# Patient Record
Sex: FEMALE | Race: WHITE | URBAN - METROPOLITAN AREA
[De-identification: names, ages, dates, MRNs, and addresses within clinical notes are randomized per-mention and may not be internally consistent; named-entity substitution may affect disease eponyms.]

---

## 2018-04-12 ENCOUNTER — EMERGENCY (EMERGENCY)
Facility: HOSPITAL | Age: 28
LOS: 0 days | Discharge: HOME | End: 2018-04-12
Attending: EMERGENCY MEDICINE

## 2018-04-12 VITALS
HEART RATE: 93 BPM | OXYGEN SATURATION: 97 % | DIASTOLIC BLOOD PRESSURE: 83 MMHG | TEMPERATURE: 98 F | RESPIRATION RATE: 18 BRPM | HEIGHT: 68 IN | WEIGHT: 190.04 LBS | SYSTOLIC BLOOD PRESSURE: 108 MMHG

## 2018-04-12 VITALS
DIASTOLIC BLOOD PRESSURE: 79 MMHG | OXYGEN SATURATION: 99 % | TEMPERATURE: 98 F | RESPIRATION RATE: 18 BRPM | HEART RATE: 70 BPM | SYSTOLIC BLOOD PRESSURE: 123 MMHG

## 2018-04-12 DIAGNOSIS — R31.9 HEMATURIA, UNSPECIFIED: ICD-10-CM

## 2018-04-12 DIAGNOSIS — R10.31 RIGHT LOWER QUADRANT PAIN: ICD-10-CM

## 2018-04-12 LAB
ALBUMIN SERPL ELPH-MCNC: 4.4 G/DL — SIGNIFICANT CHANGE UP (ref 3.5–5.2)
ALP SERPL-CCNC: 56 U/L — SIGNIFICANT CHANGE UP (ref 30–115)
ALT FLD-CCNC: 7 U/L — SIGNIFICANT CHANGE UP (ref 0–41)
ANION GAP SERPL CALC-SCNC: 11 MMOL/L — SIGNIFICANT CHANGE UP (ref 7–14)
APPEARANCE UR: CLEAR — SIGNIFICANT CHANGE UP
APTT BLD: 33.2 SEC — SIGNIFICANT CHANGE UP (ref 27–39.2)
AST SERPL-CCNC: 12 U/L — SIGNIFICANT CHANGE UP (ref 0–41)
BASOPHILS # BLD AUTO: 0.09 K/UL — SIGNIFICANT CHANGE UP (ref 0–0.2)
BASOPHILS NFR BLD AUTO: 0.9 % — SIGNIFICANT CHANGE UP (ref 0–1)
BILIRUB SERPL-MCNC: 0.3 MG/DL — SIGNIFICANT CHANGE UP (ref 0.2–1.2)
BILIRUB UR-MCNC: NEGATIVE — SIGNIFICANT CHANGE UP
BUN SERPL-MCNC: 9 MG/DL — LOW (ref 10–20)
CALCIUM SERPL-MCNC: 9.7 MG/DL — SIGNIFICANT CHANGE UP (ref 8.5–10.1)
CHLORIDE SERPL-SCNC: 104 MMOL/L — SIGNIFICANT CHANGE UP (ref 98–110)
CO2 SERPL-SCNC: 28 MMOL/L — SIGNIFICANT CHANGE UP (ref 17–32)
COLOR SPEC: YELLOW — SIGNIFICANT CHANGE UP
CREAT SERPL-MCNC: 0.6 MG/DL — LOW (ref 0.7–1.5)
DIFF PNL FLD: NEGATIVE — SIGNIFICANT CHANGE UP
EOSINOPHIL # BLD AUTO: 0.23 K/UL — SIGNIFICANT CHANGE UP (ref 0–0.7)
EOSINOPHIL NFR BLD AUTO: 2.2 % — SIGNIFICANT CHANGE UP (ref 0–8)
EPI CELLS # UR: (no result) /HPF
GLUCOSE SERPL-MCNC: 91 MG/DL — SIGNIFICANT CHANGE UP (ref 70–99)
GLUCOSE UR QL: NEGATIVE MG/DL — SIGNIFICANT CHANGE UP
HCT VFR BLD CALC: 39.3 % — SIGNIFICANT CHANGE UP (ref 37–47)
HGB BLD-MCNC: 12.9 G/DL — SIGNIFICANT CHANGE UP (ref 12–16)
IMM GRANULOCYTES NFR BLD AUTO: 0.9 % — HIGH (ref 0.1–0.3)
INR BLD: 1.02 RATIO — SIGNIFICANT CHANGE UP (ref 0.65–1.3)
KETONES UR-MCNC: NEGATIVE — SIGNIFICANT CHANGE UP
LACTATE SERPL-SCNC: 1.4 MMOL/L — SIGNIFICANT CHANGE UP (ref 0.5–2.2)
LEUKOCYTE ESTERASE UR-ACNC: (no result)
LYMPHOCYTES # BLD AUTO: 2.74 K/UL — SIGNIFICANT CHANGE UP (ref 1.2–3.4)
LYMPHOCYTES # BLD AUTO: 26.5 % — SIGNIFICANT CHANGE UP (ref 20.5–51.1)
MCHC RBC-ENTMCNC: 31.5 PG — HIGH (ref 27–31)
MCHC RBC-ENTMCNC: 32.8 G/DL — SIGNIFICANT CHANGE UP (ref 32–37)
MCV RBC AUTO: 96.1 FL — SIGNIFICANT CHANGE UP (ref 81–99)
MONOCYTES # BLD AUTO: 0.56 K/UL — SIGNIFICANT CHANGE UP (ref 0.1–0.6)
MONOCYTES NFR BLD AUTO: 5.4 % — SIGNIFICANT CHANGE UP (ref 1.7–9.3)
NEUTROPHILS # BLD AUTO: 6.62 K/UL — HIGH (ref 1.4–6.5)
NEUTROPHILS NFR BLD AUTO: 64.1 % — SIGNIFICANT CHANGE UP (ref 42.2–75.2)
NITRITE UR-MCNC: NEGATIVE — SIGNIFICANT CHANGE UP
NRBC # BLD: 0 /100 WBCS — SIGNIFICANT CHANGE UP (ref 0–0)
PH UR: 7 — SIGNIFICANT CHANGE UP (ref 5–8)
PLATELET # BLD AUTO: 327 K/UL — SIGNIFICANT CHANGE UP (ref 130–400)
POTASSIUM SERPL-MCNC: 4.7 MMOL/L — SIGNIFICANT CHANGE UP (ref 3.5–5)
POTASSIUM SERPL-SCNC: 4.7 MMOL/L — SIGNIFICANT CHANGE UP (ref 3.5–5)
PROT SERPL-MCNC: 6.3 G/DL — SIGNIFICANT CHANGE UP (ref 6–8)
PROT UR-MCNC: NEGATIVE MG/DL — SIGNIFICANT CHANGE UP
PROTHROM AB SERPL-ACNC: 11 SEC — SIGNIFICANT CHANGE UP (ref 9.95–12.87)
RBC # BLD: 4.09 M/UL — LOW (ref 4.2–5.4)
RBC # FLD: 13.2 % — SIGNIFICANT CHANGE UP (ref 11.5–14.5)
SODIUM SERPL-SCNC: 143 MMOL/L — SIGNIFICANT CHANGE UP (ref 135–146)
SP GR SPEC: 1.01 — SIGNIFICANT CHANGE UP (ref 1.01–1.03)
UROBILINOGEN FLD QL: 0.2 MG/DL — SIGNIFICANT CHANGE UP (ref 0.2–0.2)
WBC # BLD: 10.33 K/UL — SIGNIFICANT CHANGE UP (ref 4.8–10.8)
WBC # FLD AUTO: 10.33 K/UL — SIGNIFICANT CHANGE UP (ref 4.8–10.8)
WBC UR QL: SIGNIFICANT CHANGE UP /HPF

## 2018-04-12 RX ORDER — KETOROLAC TROMETHAMINE 30 MG/ML
15 SYRINGE (ML) INJECTION ONCE
Qty: 0 | Refills: 0 | Status: DISCONTINUED | OUTPATIENT
Start: 2018-04-12 | End: 2018-04-12

## 2018-04-12 RX ORDER — SODIUM CHLORIDE 9 MG/ML
1000 INJECTION, SOLUTION INTRAVENOUS ONCE
Qty: 0 | Refills: 0 | Status: COMPLETED | OUTPATIENT
Start: 2018-04-12 | End: 2018-04-12

## 2018-04-12 RX ADMIN — Medication 15 MILLIGRAM(S): at 21:16

## 2018-04-12 RX ADMIN — Medication 15 MILLIGRAM(S): at 16:19

## 2018-04-12 NOTE — ED PROVIDER NOTE - NS ED ROS FT
Eyes:  No visual changes, eye pain or discharge.  ENMT:  No hearing changes, pain, discharge or infections. No neck pain or stiffness.  Cardiac:  No chest pain, SOB or edema.   Respiratory:  No cough or respiratory distress.   GI:  R flank and RLQ abdominal pain. No nausea, vomiting, diarrhea  :  Dysuria, hematuria. No vaginal discharge, vaginal bleeding.   MS:  No myalgia, muscle weakness, joint pain or back pain.  Neuro:  No headache or weakness.  No LOC.  Skin:  No skin rash.

## 2018-04-12 NOTE — ED PROVIDER NOTE - ATTENDING CONTRIBUTION TO CARE
28 yo F presents with c/o right sided flank pain radiating to groin x 2 weeks.  Initially saw blood in urine, no dysuria, no n/v/d, no fever or chills.  Seen by her Gyn earlier in the week, had sono, pap and cultures and started on Macrobid for WBCs in the urine.  On exam pt in NAD AAO x 3, well appearing, Abd is soft nt nd, no CVA tenderness, no rash

## 2018-04-12 NOTE — ED PROVIDER NOTE - MEDICAL DECISION MAKING DETAILS
Results d/w patient and copies provided. Pt instructed to drink plenty of fluids post IV dye.  Pt instructed to follow up withher PMD and gyn and return if any worsneing symptoms or concerns.  She verbalizes understanding

## 2018-04-12 NOTE — ED PROVIDER NOTE - PHYSICAL EXAMINATION
CONSTITUTIONAL: Well-developed; well-nourished; in no acute distress.   SKIN: warm, dry  HEAD: Normocephalic; atraumatic.  EYES: normal sclera and conjunctiva   ENT: No nasal discharge; airway clear.  NECK: Supple; non tender.  CARD: S1, S2 normal; no murmurs, gallops, or rubs. Regular rate and rhythm.   RESP: No wheezes, rales or rhonchi.  ABD: RLQ and suprapubic pain. No CVA tenderness. No guarding, no rebound. Negative Rios's sign. Soft, nondistended.   EXT: Normal ROM.  No clubbing, cyanosis or edema.   NEURO: Alert, oriented, grossly unremarkable  PSYCH: Cooperative, appropriate.

## 2018-04-12 NOTE — ED PROVIDER NOTE - OBJECTIVE STATEMENT
27 y f no pmh pw flank pain. R flank pain radiating to RLQ and R groin x2 weeks. No alleviating or exacerbating factors. Associated with hematuria and dysuria. Seen by ob earlier this week and had some wbc in urine. Started on abx. Sexually active with 1 partner, IUD in place. No fever, chills, n/v, diarrhea, constipation, vaginal discharge, vaginal bleeding. 27 y f no pmh pw flank pain. R flank pain radiating to RLQ and R groin x2 weeks. No alleviating or exacerbating factors. Associated with hematuria and dysuria. Seen by ob earlier this week and had some wbc in urine. Started on abx. Sexually active with 1 partner, IUD in place. No fever, chills, n/v, diarrhea, constipation, vaginal discharge, vaginal bleeding. Fmhx of kidney stones. No personal hx of kidney stones.

## 2018-04-12 NOTE — ED PROVIDER NOTE - PROGRESS NOTE DETAILS
Discussed results with patient. Discussed potential passed stone vs ovarian cyst. Recommended f/u with gynecologist and pmd. Discussed tylenol and motrin for pain relief. Will print results for pt.

## 2018-08-02 ENCOUNTER — EMERGENCY (EMERGENCY)
Facility: HOSPITAL | Age: 28
LOS: 0 days | Discharge: HOME | End: 2018-08-02
Attending: EMERGENCY MEDICINE | Admitting: EMERGENCY MEDICINE

## 2018-08-02 VITALS — DIASTOLIC BLOOD PRESSURE: 71 MMHG | SYSTOLIC BLOOD PRESSURE: 100 MMHG

## 2018-08-02 VITALS
RESPIRATION RATE: 18 BRPM | HEIGHT: 68 IN | HEART RATE: 105 BPM | OXYGEN SATURATION: 99 % | TEMPERATURE: 100 F | DIASTOLIC BLOOD PRESSURE: 56 MMHG | SYSTOLIC BLOOD PRESSURE: 112 MMHG | WEIGHT: 195.11 LBS

## 2018-08-02 DIAGNOSIS — R10.84 GENERALIZED ABDOMINAL PAIN: ICD-10-CM

## 2018-08-02 DIAGNOSIS — R10.32 LEFT LOWER QUADRANT PAIN: ICD-10-CM

## 2018-08-02 DIAGNOSIS — R35.0 FREQUENCY OF MICTURITION: ICD-10-CM

## 2018-08-02 DIAGNOSIS — R50.9 FEVER, UNSPECIFIED: ICD-10-CM

## 2018-08-02 DIAGNOSIS — R11.0 NAUSEA: ICD-10-CM

## 2018-08-02 DIAGNOSIS — Z79.899 OTHER LONG TERM (CURRENT) DRUG THERAPY: ICD-10-CM

## 2018-08-02 DIAGNOSIS — R00.0 TACHYCARDIA, UNSPECIFIED: ICD-10-CM

## 2018-08-02 DIAGNOSIS — R30.0 DYSURIA: ICD-10-CM

## 2018-08-02 DIAGNOSIS — R51 HEADACHE: ICD-10-CM

## 2018-08-02 DIAGNOSIS — R21 RASH AND OTHER NONSPECIFIC SKIN ERUPTION: ICD-10-CM

## 2018-08-02 LAB
ALBUMIN SERPL ELPH-MCNC: 4.4 G/DL — SIGNIFICANT CHANGE UP (ref 3.5–5.2)
ALP SERPL-CCNC: 47 U/L — SIGNIFICANT CHANGE UP (ref 30–115)
ALT FLD-CCNC: 9 U/L — SIGNIFICANT CHANGE UP (ref 0–41)
ANION GAP SERPL CALC-SCNC: 14 MMOL/L — SIGNIFICANT CHANGE UP (ref 7–14)
APPEARANCE UR: CLEAR — SIGNIFICANT CHANGE UP
AST SERPL-CCNC: 23 U/L — SIGNIFICANT CHANGE UP (ref 0–41)
BASE EXCESS BLDV CALC-SCNC: -0.1 MMOL/L — SIGNIFICANT CHANGE UP (ref -2–2)
BASOPHILS # BLD AUTO: 0.03 K/UL — SIGNIFICANT CHANGE UP (ref 0–0.2)
BASOPHILS NFR BLD AUTO: 0.5 % — SIGNIFICANT CHANGE UP (ref 0–1)
BILIRUB SERPL-MCNC: 0.4 MG/DL — SIGNIFICANT CHANGE UP (ref 0.2–1.2)
BILIRUB UR-MCNC: NEGATIVE — SIGNIFICANT CHANGE UP
BUN SERPL-MCNC: 5 MG/DL — LOW (ref 10–20)
CA-I SERPL-SCNC: 1.11 MMOL/L — LOW (ref 1.12–1.3)
CALCIUM SERPL-MCNC: 9.2 MG/DL — SIGNIFICANT CHANGE UP (ref 8.5–10.1)
CHLORIDE SERPL-SCNC: 99 MMOL/L — SIGNIFICANT CHANGE UP (ref 98–110)
CO2 SERPL-SCNC: 23 MMOL/L — SIGNIFICANT CHANGE UP (ref 17–32)
COLOR SPEC: YELLOW — SIGNIFICANT CHANGE UP
CREAT SERPL-MCNC: 0.9 MG/DL — SIGNIFICANT CHANGE UP (ref 0.7–1.5)
DIFF PNL FLD: NEGATIVE — SIGNIFICANT CHANGE UP
EOSINOPHIL # BLD AUTO: 0.01 K/UL — SIGNIFICANT CHANGE UP (ref 0–0.7)
EOSINOPHIL NFR BLD AUTO: 0.2 % — SIGNIFICANT CHANGE UP (ref 0–8)
GAS PNL BLDV: 134 MMOL/L — LOW (ref 136–145)
GAS PNL BLDV: SIGNIFICANT CHANGE UP
GLUCOSE SERPL-MCNC: 92 MG/DL — SIGNIFICANT CHANGE UP (ref 70–99)
GLUCOSE UR QL: NEGATIVE MG/DL — SIGNIFICANT CHANGE UP
HCO3 BLDV-SCNC: 24 MMOL/L — SIGNIFICANT CHANGE UP (ref 22–29)
HCT VFR BLD CALC: 32.1 % — LOW (ref 37–47)
HCT VFR BLDA CALC: 34.8 % — SIGNIFICANT CHANGE UP (ref 34–44)
HGB BLD CALC-MCNC: 11.4 G/DL — LOW (ref 14–18)
HGB BLD-MCNC: 10.7 G/DL — LOW (ref 12–16)
HOROWITZ INDEX BLDV+IHG-RTO: 21 — SIGNIFICANT CHANGE UP
IMM GRANULOCYTES NFR BLD AUTO: 0.7 % — HIGH (ref 0.1–0.3)
KETONES UR-MCNC: NEGATIVE — SIGNIFICANT CHANGE UP
LACTATE BLDV-MCNC: 0.6 MMOL/L — SIGNIFICANT CHANGE UP (ref 0.5–1.6)
LEUKOCYTE ESTERASE UR-ACNC: NEGATIVE — SIGNIFICANT CHANGE UP
LIDOCAIN IGE QN: 13 U/L — SIGNIFICANT CHANGE UP (ref 7–60)
LYMPHOCYTES # BLD AUTO: 0.55 K/UL — LOW (ref 1.2–3.4)
LYMPHOCYTES # BLD AUTO: 9.6 % — LOW (ref 20.5–51.1)
MCHC RBC-ENTMCNC: 31.9 PG — HIGH (ref 27–31)
MCHC RBC-ENTMCNC: 33.3 G/DL — SIGNIFICANT CHANGE UP (ref 32–37)
MCV RBC AUTO: 95.8 FL — SIGNIFICANT CHANGE UP (ref 81–99)
MONOCYTES # BLD AUTO: 0.69 K/UL — HIGH (ref 0.1–0.6)
MONOCYTES NFR BLD AUTO: 12 % — HIGH (ref 1.7–9.3)
NEUTROPHILS # BLD AUTO: 4.41 K/UL — SIGNIFICANT CHANGE UP (ref 1.4–6.5)
NEUTROPHILS NFR BLD AUTO: 77 % — HIGH (ref 42.2–75.2)
NITRITE UR-MCNC: NEGATIVE — SIGNIFICANT CHANGE UP
NRBC # BLD: 0 /100 WBCS — SIGNIFICANT CHANGE UP (ref 0–0)
PCO2 BLDV: 39 MMHG — LOW (ref 41–51)
PH BLDV: 7.41 — SIGNIFICANT CHANGE UP (ref 7.26–7.43)
PH UR: 7 — SIGNIFICANT CHANGE UP (ref 5–8)
PLATELET # BLD AUTO: 156 K/UL — SIGNIFICANT CHANGE UP (ref 130–400)
PO2 BLDV: 38 MMHG — SIGNIFICANT CHANGE UP (ref 20–40)
POTASSIUM BLDV-SCNC: 3.3 MMOL/L — SIGNIFICANT CHANGE UP (ref 3.3–5.6)
POTASSIUM SERPL-MCNC: 4.9 MMOL/L — SIGNIFICANT CHANGE UP (ref 3.5–5)
POTASSIUM SERPL-SCNC: 4.9 MMOL/L — SIGNIFICANT CHANGE UP (ref 3.5–5)
PROT SERPL-MCNC: 6.8 G/DL — SIGNIFICANT CHANGE UP (ref 6–8)
PROT UR-MCNC: NEGATIVE MG/DL — SIGNIFICANT CHANGE UP
RBC # BLD: 3.35 M/UL — LOW (ref 4.2–5.4)
RBC # FLD: 13 % — SIGNIFICANT CHANGE UP (ref 11.5–14.5)
SAO2 % BLDV: 72 % — SIGNIFICANT CHANGE UP
SODIUM SERPL-SCNC: 136 MMOL/L — SIGNIFICANT CHANGE UP (ref 135–146)
SP GR SPEC: 1.01 — SIGNIFICANT CHANGE UP (ref 1.01–1.03)
UROBILINOGEN FLD QL: 0.2 MG/DL — SIGNIFICANT CHANGE UP (ref 0.2–0.2)
WBC # BLD: 5.73 K/UL — SIGNIFICANT CHANGE UP (ref 4.8–10.8)
WBC # FLD AUTO: 5.73 K/UL — SIGNIFICANT CHANGE UP (ref 4.8–10.8)

## 2018-08-02 RX ORDER — METOCLOPRAMIDE HCL 10 MG
5 TABLET ORAL ONCE
Qty: 0 | Refills: 0 | Status: DISCONTINUED | OUTPATIENT
Start: 2018-08-02 | End: 2018-08-02

## 2018-08-02 RX ORDER — METOCLOPRAMIDE HCL 10 MG
10 TABLET ORAL ONCE
Qty: 0 | Refills: 0 | Status: COMPLETED | OUTPATIENT
Start: 2018-08-02 | End: 2018-08-02

## 2018-08-02 RX ORDER — MORPHINE SULFATE 50 MG/1
4 CAPSULE, EXTENDED RELEASE ORAL ONCE
Qty: 0 | Refills: 0 | Status: DISCONTINUED | OUTPATIENT
Start: 2018-08-02 | End: 2018-08-02

## 2018-08-02 RX ORDER — METOCLOPRAMIDE HCL 10 MG
10 TABLET ORAL ONCE
Qty: 0 | Refills: 0 | Status: DISCONTINUED | OUTPATIENT
Start: 2018-08-02 | End: 2018-08-02

## 2018-08-02 RX ORDER — SODIUM CHLORIDE 9 MG/ML
1000 INJECTION, SOLUTION INTRAVENOUS ONCE
Qty: 0 | Refills: 0 | Status: COMPLETED | OUTPATIENT
Start: 2018-08-02 | End: 2018-08-02

## 2018-08-02 RX ORDER — IBUPROFEN 200 MG
600 TABLET ORAL ONCE
Qty: 0 | Refills: 0 | Status: COMPLETED | OUTPATIENT
Start: 2018-08-02 | End: 2018-08-02

## 2018-08-02 RX ORDER — IOHEXOL 300 MG/ML
30 INJECTION, SOLUTION INTRAVENOUS ONCE
Qty: 0 | Refills: 0 | Status: COMPLETED | OUTPATIENT
Start: 2018-08-02 | End: 2018-08-02

## 2018-08-02 RX ORDER — ONDANSETRON 8 MG/1
4 TABLET, FILM COATED ORAL ONCE
Qty: 0 | Refills: 0 | Status: COMPLETED | OUTPATIENT
Start: 2018-08-02 | End: 2018-08-02

## 2018-08-02 RX ADMIN — IOHEXOL 30 MILLILITER(S): 300 INJECTION, SOLUTION INTRAVENOUS at 11:26

## 2018-08-02 RX ADMIN — ONDANSETRON 4 MILLIGRAM(S): 8 TABLET, FILM COATED ORAL at 10:47

## 2018-08-02 RX ADMIN — Medication 10 MILLIGRAM(S): at 11:00

## 2018-08-02 RX ADMIN — MORPHINE SULFATE 4 MILLIGRAM(S): 50 CAPSULE, EXTENDED RELEASE ORAL at 11:26

## 2018-08-02 RX ADMIN — MORPHINE SULFATE 4 MILLIGRAM(S): 50 CAPSULE, EXTENDED RELEASE ORAL at 10:47

## 2018-08-02 RX ADMIN — Medication 600 MILLIGRAM(S): at 14:38

## 2018-08-02 RX ADMIN — SODIUM CHLORIDE 2000 MILLILITER(S): 9 INJECTION, SOLUTION INTRAVENOUS at 10:47

## 2018-08-02 NOTE — ED ADULT NURSE NOTE - NSIMPLEMENTINTERV_GEN_ALL_ED
Implemented All Universal Safety Interventions:  [place] to call system. Call bell, personal items and telephone within reach. Instruct patient to call for assistance. Room bathroom lighting operational. Non-slip footwear when patient is off stretcher. Physically safe environment: no spills, clutter or unnecessary equipment. Stretcher in lowest position, wheels locked, appropriate side rails in place.

## 2018-08-02 NOTE — ED PROVIDER NOTE - PHYSICAL EXAMINATION
CONSTITUTIONAL: Well-developed; well-nourished; in no acute distress.   SKIN: warm, dry  HEAD: Normocephalic; atraumatic.  EYES: PERRL, EOMI, no conjunctival erythema  ENT: No nasal discharge; airway clear.  NECK: Supple; non tender.  CARD: S1, S2 normal; no murmurs, gallops, or rubs. Regular rate and rhythm.   RESP: No wheezes, rales or rhonchi.  ABD: soft, positive Rovsing's sign, +LLQ tenderness with + Rt CVA tenderness, negative rebound, rigidity or guarding  EXT: Normal ROM.  No clubbing, cyanosis or edema.   LYMPH: No acute cervical adenopathy.  NEURO: Alert, oriented, grossly unremarkable  PSYCH: Cooperative, appropriate. CONSTITUTIONAL: Well-developed; well-nourished; in no acute distress.   SKIN: warm, dry  HEAD: Normocephalic; atraumatic.  EYES: PERRL, EOMI, no conjunctival erythema  ENT: No nasal discharge; airway clear.  NECK: Supple; non tender. Normal ROM  CARD: S1, S2 normal; no murmurs, gallops, or rubs. Regular rate and rhythm.   RESP: No wheezes, rales or rhonchi.  ABD: soft, positive Rovsing's sign, +LLQ tenderness with + Rt CVA tenderness, negative rebound, rigidity or guarding  EXT: Normal ROM.  No clubbing, cyanosis or edema.   LYMPH: No acute cervical adenopathy.  NEURO: Alert, oriented, grossly unremarkable  PSYCH: Cooperative, appropriate.

## 2018-08-02 NOTE — ED PROVIDER NOTE - OBJECTIVE STATEMENT
27 year old female with pmhx of nephrolithiasis presents with LLQ abdominal pain, Rt sided flank pain and fever beginning last night. She states that she also had measured fever of 102 at that time. Confirms nausea without vomiting or diarrhea and notes pain with PO intake. Also notes orange foul smelling discharge and spotting since 2-3 days ago. LMP was 2 weeks ago. PT states that she also has an IUD in place. Also c/o dysuria without hematuria. 27 year old female with pmhx of nephrolithiasis presents with LLQ abdominal pain, Rt sided flank pain and fever beginning last night. She states that she also had measured fever of 102 at that time. Confirms nausea without vomiting or diarrhea and notes pain with PO intake. Also notes orange foul smelling discharge and spotting since 2-3 days ago. LMP was 2 weeks ago. PT states that she also has an IUD in place. Also c/o dysuria without hematuria. Confirms HA without neck pain. No visual sx or photophobia.

## 2018-08-02 NOTE — ED PROVIDER NOTE - ATTENDING CONTRIBUTION TO CARE
27yr old female with reported hx of possible kidney stones here for eval of abdominal pain. the pt reports since yesterday she is having right flank associarted with fever, tmax 102. additionally pt report headache, orange foul smelling discharge. + nausea without vomitting/diarrhea. pt is sexually active with 1 partner. no concern for std. CON: ao x 3, HENMT: clear oropharynx, no sinus ttp,   neck supple, full rom of neck CV: s1s2 borderline tachy, equal pulses b/l, RESP: cta b/l, GI:  soft, mild diffuse ttp w/o r/g SKIN: no rash, MSK: no cva ttp, NEURO: no gross motor or sensory deficit, normal gait, Psychiatric: appropriate mood, appropriate affect

## 2018-08-02 NOTE — ED PROVIDER NOTE - PROGRESS NOTE DETAILS
pt felt improved abdomen soft nt/nd, tolerated po, imaging and labs reviewed with pt. pelvic exam by Dr. Vallecillo WNL. no cmt ttp, no discharge.

## 2018-08-02 NOTE — ED PROVIDER NOTE - NS ED ROS FT
Eyes:  No visual changes, eye pain or discharge.  ENMT:  No hearing changes, pain, no sore throat or runny nose, no difficulty swallowing  Cardiac:  No chest pain, SOB or edema. No chest pain with exertion.  Respiratory:  No cough or respiratory distress. No hemoptysis. No history of asthma or RAD.  GI:  + nausea, no vomiting, no diarrhea + abdominal pain.  :  + dysuria, frequency - burning.  MS:  No myalgia, muscle weakness, joint pain or back pain.  Neuro:   headache or weakness.  No LOC.  Skin:  + facial skin rash.   Endocrine: No history of thyroid disease or diabetes.

## 2018-08-02 NOTE — ED PROVIDER NOTE - MEDICAL DECISION MAKING DETAILS
no clear source of fever form exam , hx and lab work. pt felt improved. stable for dc last bp 100/70 approx

## 2018-08-03 LAB
CULTURE RESULTS: SIGNIFICANT CHANGE UP
SPECIMEN SOURCE: SIGNIFICANT CHANGE UP

## 2019-05-30 NOTE — ED ADULT NURSE NOTE - NSFALLRSKOUTCOME_ED_ALL_ED
Universal Safety Interventions
Principal Discharge DX:	Chronic bilateral thoracic back pain  Secondary Diagnosis:	Non-intractable vomiting with nausea, unspecified vomiting type

## 2020-05-14 ENCOUNTER — EMERGENCY (EMERGENCY)
Facility: HOSPITAL | Age: 30
LOS: 0 days | Discharge: HOME | End: 2020-05-14
Attending: EMERGENCY MEDICINE | Admitting: EMERGENCY MEDICINE
Payer: MEDICAID

## 2020-05-14 VITALS
OXYGEN SATURATION: 100 % | WEIGHT: 166.89 LBS | RESPIRATION RATE: 20 BRPM | TEMPERATURE: 97 F | SYSTOLIC BLOOD PRESSURE: 127 MMHG | HEIGHT: 69 IN | DIASTOLIC BLOOD PRESSURE: 70 MMHG | HEART RATE: 84 BPM

## 2020-05-14 VITALS
HEART RATE: 61 BPM | OXYGEN SATURATION: 100 % | SYSTOLIC BLOOD PRESSURE: 100 MMHG | TEMPERATURE: 98 F | DIASTOLIC BLOOD PRESSURE: 60 MMHG | RESPIRATION RATE: 18 BRPM

## 2020-05-14 DIAGNOSIS — M54.9 DORSALGIA, UNSPECIFIED: ICD-10-CM

## 2020-05-14 DIAGNOSIS — Z79.899 OTHER LONG TERM (CURRENT) DRUG THERAPY: ICD-10-CM

## 2020-05-14 DIAGNOSIS — R11.10 VOMITING, UNSPECIFIED: ICD-10-CM

## 2020-05-14 DIAGNOSIS — R10.9 UNSPECIFIED ABDOMINAL PAIN: ICD-10-CM

## 2020-05-14 DIAGNOSIS — R10.13 EPIGASTRIC PAIN: ICD-10-CM

## 2020-05-14 DIAGNOSIS — R10.32 LEFT LOWER QUADRANT PAIN: ICD-10-CM

## 2020-05-14 DIAGNOSIS — K21.9 GASTRO-ESOPHAGEAL REFLUX DISEASE WITHOUT ESOPHAGITIS: ICD-10-CM

## 2020-05-14 DIAGNOSIS — R19.7 DIARRHEA, UNSPECIFIED: ICD-10-CM

## 2020-05-14 LAB
ALBUMIN SERPL ELPH-MCNC: 4.7 G/DL — SIGNIFICANT CHANGE UP (ref 3.5–5.2)
ALP SERPL-CCNC: 43 U/L — SIGNIFICANT CHANGE UP (ref 30–115)
ALT FLD-CCNC: 10 U/L — SIGNIFICANT CHANGE UP (ref 0–41)
ANION GAP SERPL CALC-SCNC: 15 MMOL/L — HIGH (ref 7–14)
APPEARANCE UR: CLEAR — SIGNIFICANT CHANGE UP
AST SERPL-CCNC: 22 U/L — SIGNIFICANT CHANGE UP (ref 0–41)
BASOPHILS # BLD AUTO: 0.07 K/UL — SIGNIFICANT CHANGE UP (ref 0–0.2)
BASOPHILS NFR BLD AUTO: 0.6 % — SIGNIFICANT CHANGE UP (ref 0–1)
BILIRUB DIRECT SERPL-MCNC: <0.2 MG/DL — SIGNIFICANT CHANGE UP (ref 0–0.2)
BILIRUB INDIRECT FLD-MCNC: >0.7 MG/DL — SIGNIFICANT CHANGE UP (ref 0.2–1.2)
BILIRUB SERPL-MCNC: 0.9 MG/DL — SIGNIFICANT CHANGE UP (ref 0.2–1.2)
BILIRUB UR-MCNC: NEGATIVE — SIGNIFICANT CHANGE UP
BUN SERPL-MCNC: 13 MG/DL — SIGNIFICANT CHANGE UP (ref 10–20)
CALCIUM SERPL-MCNC: 9.2 MG/DL — SIGNIFICANT CHANGE UP (ref 8.5–10.1)
CHLORIDE SERPL-SCNC: 98 MMOL/L — SIGNIFICANT CHANGE UP (ref 98–110)
CO2 SERPL-SCNC: 24 MMOL/L — SIGNIFICANT CHANGE UP (ref 17–32)
COLOR SPEC: YELLOW — SIGNIFICANT CHANGE UP
CREAT SERPL-MCNC: 0.8 MG/DL — SIGNIFICANT CHANGE UP (ref 0.7–1.5)
DIFF PNL FLD: ABNORMAL
EOSINOPHIL # BLD AUTO: 0.04 K/UL — SIGNIFICANT CHANGE UP (ref 0–0.7)
EOSINOPHIL NFR BLD AUTO: 0.4 % — SIGNIFICANT CHANGE UP (ref 0–8)
EPI CELLS # UR: ABNORMAL /HPF
GLUCOSE SERPL-MCNC: 80 MG/DL — SIGNIFICANT CHANGE UP (ref 70–99)
GLUCOSE UR QL: NEGATIVE — SIGNIFICANT CHANGE UP
HCG SERPL QL: NEGATIVE — SIGNIFICANT CHANGE UP
HCT VFR BLD CALC: 39.3 % — SIGNIFICANT CHANGE UP (ref 37–47)
HGB BLD-MCNC: 13 G/DL — SIGNIFICANT CHANGE UP (ref 12–16)
IMM GRANULOCYTES NFR BLD AUTO: 0.3 % — SIGNIFICANT CHANGE UP (ref 0.1–0.3)
KETONES UR-MCNC: 80 — SIGNIFICANT CHANGE UP
LACTATE SERPL-SCNC: 1.4 MMOL/L — SIGNIFICANT CHANGE UP (ref 0.7–2)
LEUKOCYTE ESTERASE UR-ACNC: NEGATIVE — SIGNIFICANT CHANGE UP
LIDOCAIN IGE QN: 17 U/L — SIGNIFICANT CHANGE UP (ref 7–60)
LYMPHOCYTES # BLD AUTO: 1.28 K/UL — SIGNIFICANT CHANGE UP (ref 1.2–3.4)
LYMPHOCYTES # BLD AUTO: 11.8 % — LOW (ref 20.5–51.1)
MAGNESIUM SERPL-MCNC: 1.8 MG/DL — SIGNIFICANT CHANGE UP (ref 1.8–2.4)
MCHC RBC-ENTMCNC: 31.2 PG — HIGH (ref 27–31)
MCHC RBC-ENTMCNC: 33.1 G/DL — SIGNIFICANT CHANGE UP (ref 32–37)
MCV RBC AUTO: 94.2 FL — SIGNIFICANT CHANGE UP (ref 81–99)
MONOCYTES # BLD AUTO: 0.27 K/UL — SIGNIFICANT CHANGE UP (ref 0.1–0.6)
MONOCYTES NFR BLD AUTO: 2.5 % — SIGNIFICANT CHANGE UP (ref 1.7–9.3)
NEUTROPHILS # BLD AUTO: 9.18 K/UL — HIGH (ref 1.4–6.5)
NEUTROPHILS NFR BLD AUTO: 84.4 % — HIGH (ref 42.2–75.2)
NITRITE UR-MCNC: NEGATIVE — SIGNIFICANT CHANGE UP
NRBC # BLD: 0 /100 WBCS — SIGNIFICANT CHANGE UP (ref 0–0)
PH UR: 6.5 — SIGNIFICANT CHANGE UP (ref 5–8)
PLATELET # BLD AUTO: 276 K/UL — SIGNIFICANT CHANGE UP (ref 130–400)
POTASSIUM SERPL-MCNC: 4.4 MMOL/L — SIGNIFICANT CHANGE UP (ref 3.5–5)
POTASSIUM SERPL-SCNC: 4.4 MMOL/L — SIGNIFICANT CHANGE UP (ref 3.5–5)
PROT SERPL-MCNC: 6.8 G/DL — SIGNIFICANT CHANGE UP (ref 6–8)
PROT UR-MCNC: NEGATIVE — SIGNIFICANT CHANGE UP
RBC # BLD: 4.17 M/UL — LOW (ref 4.2–5.4)
RBC # FLD: 13.3 % — SIGNIFICANT CHANGE UP (ref 11.5–14.5)
SODIUM SERPL-SCNC: 137 MMOL/L — SIGNIFICANT CHANGE UP (ref 135–146)
SP GR SPEC: 1.01 — SIGNIFICANT CHANGE UP (ref 1.01–1.03)
UROBILINOGEN FLD QL: 0.2 — SIGNIFICANT CHANGE UP (ref 0.2–0.2)
WBC # BLD: 10.87 K/UL — HIGH (ref 4.8–10.8)
WBC # FLD AUTO: 10.87 K/UL — HIGH (ref 4.8–10.8)
WBC UR QL: SIGNIFICANT CHANGE UP /HPF

## 2020-05-14 PROCEDURE — 99285 EMERGENCY DEPT VISIT HI MDM: CPT

## 2020-05-14 PROCEDURE — 74177 CT ABD & PELVIS W/CONTRAST: CPT | Mod: 26

## 2020-05-14 PROCEDURE — 71046 X-RAY EXAM CHEST 2 VIEWS: CPT | Mod: 26

## 2020-05-14 RX ORDER — METHOCARBAMOL 500 MG/1
1000 TABLET, FILM COATED ORAL ONCE
Refills: 0 | Status: COMPLETED | OUTPATIENT
Start: 2020-05-14 | End: 2020-05-14

## 2020-05-14 RX ORDER — ONDANSETRON 8 MG/1
4 TABLET, FILM COATED ORAL ONCE
Refills: 0 | Status: COMPLETED | OUTPATIENT
Start: 2020-05-14 | End: 2020-05-14

## 2020-05-14 RX ORDER — KETOROLAC TROMETHAMINE 30 MG/ML
15 SYRINGE (ML) INJECTION ONCE
Refills: 0 | Status: DISCONTINUED | OUTPATIENT
Start: 2020-05-14 | End: 2020-05-14

## 2020-05-14 RX ORDER — SODIUM CHLORIDE 9 MG/ML
1000 INJECTION, SOLUTION INTRAVENOUS ONCE
Refills: 0 | Status: COMPLETED | OUTPATIENT
Start: 2020-05-14 | End: 2020-05-14

## 2020-05-14 RX ADMIN — ONDANSETRON 4 MILLIGRAM(S): 8 TABLET, FILM COATED ORAL at 09:55

## 2020-05-14 RX ADMIN — METHOCARBAMOL 1000 MILLIGRAM(S): 500 TABLET, FILM COATED ORAL at 12:31

## 2020-05-14 RX ADMIN — SODIUM CHLORIDE 1000 MILLILITER(S): 9 INJECTION, SOLUTION INTRAVENOUS at 09:56

## 2020-05-14 RX ADMIN — Medication 15 MILLIGRAM(S): at 12:31

## 2020-05-14 NOTE — ED ADULT NURSE NOTE - NSIMPLEMENTINTERV_GEN_ALL_ED
Implemented All Universal Safety Interventions:  [place] to call system. Call bell, personal items and telephone within reach. Instruct patient to call for assistance. Room bathroom lighting operational. Non-slip footwear when patient is off stretcher. Physically safe environment: no spills, clutter or unnecessary equipment. Stretcher in lowest position, wheels locked, appropriate side rails in place.

## 2020-05-14 NOTE — ED PROVIDER NOTE - OBJECTIVE STATEMENT
30y/o F w/ hx of GERD and kidney stone presents for 'my GERD" and vomiting since monday. pt started zofran and omeprazole yesterday with no improvement. pt with nb vomit 8x a day, last vomit 30 mins ago.  pt also with nb watery diarrhea for the same amount.  pt complaining of lower abdominal pain.  discomfort of chest  with vomiting. no fevers or chills. no cough, congestion, runny nose.  no rashes. no urinary symptoms. 28y/o F w/ hx of GERD and kidney stone presents for 'my GERD" and vomiting since monday. pt started zofran and omeprazole yesterday with no improvement. pt with nb vomit 8x a day, last vomit 30 mins ago.  pt also with nb watery diarrhea for the same amount.  pt complaining of lower abdominal pain.  discomfort of chest  with vomiting. no fevers or chills. no cough, congestion, runny nose.  no rashes. no urinary symptoms. no vaginal discharge, no hx of stds 30y/o F w/ hx of GERD and kidney stone presents for 'my GERD" and vomiting since monday. pt started zofran and omeprazole yesterday with no improvement. pt with nb vomit 8x a day, last vomit 30 mins ago.  pt also with nb watery diarrhea for the same amount.  pt complaining of lower abdominal pain.  discomfort of chest  with vomiting. no fevers or chills. no cough, congestion, runny nose.  no rashes. no urinary symptoms. no vaginal discharge, no hx of stds  endoscopy 2 years ago showing hernia and ulcer.

## 2020-05-14 NOTE — ED PROVIDER NOTE - PROGRESS NOTE DETAILS
pt complaining of upper back pain, pain is reproducible, reviewed xray no abnormalities, ekg normal.  pain control given.  ct returned pt was aware of pulmonary nodules. waiting on urine.

## 2020-05-14 NOTE — ED PROVIDER NOTE - CARE PROVIDER_API CALL
Camilo Mancia  GASTROENTEROLOGY  [address] [place] [id number]  Phone: [phone]  Fax: [phone]  Follow Up Time:

## 2020-05-14 NOTE — ED PROVIDER NOTE - PHYSICAL EXAMINATION
VITAL SIGNS: I have reviewed nursing notes and confirm.  CONSTITUTIONAL: Well-developed; well-nourished; in no acute distress. pt comfortable.  SKIN: skin exam is warm and dry, no acute rash.   HEAD: Normocephalic; atraumatic.  EYES:  EOM intact; conjunctiva and sclera clear.  ENT: No nasal discharge; airway clear. moist oral mucosa;   NECK: Supple; non tender.  CARD: S1, S2 normal; no murmurs, gallops, or rubs. Regular rate and rhythm. posterior tibial and radial pulses 2+  RESP: No wheezes, rales or rhonchi. cta b/l. no use of accessory muscles. no retractions  ABD: Normal bowel sounds; soft; non-distended; llq abdominal tenderness; no rebound. negative psoas, rovsign's and murphys.  EXT: Normal ROM. No  cyanosis or edema.  BACK: No cva tenderness  LYMPH: No acute cervical adenopathy.  NEURO: Alert, oriented, grossly unremarkable.    PSYCH: Cooperative, appropriate.

## 2020-05-14 NOTE — ED PROVIDER NOTE - ATTENDING CONTRIBUTION TO CARE
I was present for and supervised the key and critical aspects of the procedures performed during the care of the patient. Patient presents fo revaluation of epigastric abdominal pain and vomiting over the past several days it is bilious but non-bloody, she denies any diarrhea she denies any fevers or chills she has a hsitory of gastritis in the past and notes that this feels similar.  she has no history of recent abx or hospitalizations.    On physical exam she is nc/at perrla eomi oropharynx clear cta b/l, rrr s1s2 noted abd-soft no guarding no rebound ext from with no edema noted   A/P- we obtained labs administered iv fluids and will ct I will continue to monitor at this time

## 2020-05-14 NOTE — ED PROVIDER NOTE - CARE PLAN
Principal Discharge DX:	Abdominal pain  Secondary Diagnosis:	Back pain  Secondary Diagnosis:	Vomiting  Secondary Diagnosis:	Diarrhea

## 2020-05-14 NOTE — ED PROVIDER NOTE - PATIENT PORTAL LINK FT
You can access the FollowMyHealth Patient Portal offered by [facility] by registering at the following website: [email or web address]. By joining [employer]’s FollowMyHealth portal, you will also be able to view your health information using other applications (apps) compatible with our system.

## 2020-05-14 NOTE — ED PROVIDER NOTE - NS ED ROS FT
Constitutional: (-) fever  Eyes/ENT: (-) blurry vision, (-) epistaxis  Cardiovascular: (+) chest pain, (-) syncope  Respiratory: (-) cough, (-) shortness of breath  Gastrointestinal: (+) vomiting, (-) diarrhea  Musculoskeletal: (-) neck pain, (-) back pain, (-) joint pain  Integumentary: (-) rash, (-) edema  Neurological: (-) headache, (-) altered mental status  Psychiatric: (-) hallucinations  Allergic/Immunologic: (-) pruritus

## 2020-05-14 NOTE — ED PROVIDER NOTE - CLINICAL SUMMARY MEDICAL DECISION MAKING FREE TEXT BOX
Patient presents for evaluation of vomiting and abdominal pain we obtained labs as well as ct scan which reveals no acute change she is aware of prior pulmonary nodules and will follow up we discussed indications to return at this time.  we administered iv fluids, iv pain medication.  I will discharge at this time

## 2020-05-14 NOTE — ED PROVIDER NOTE - NSFOLLOWUPINSTRUCTIONS_ED_ALL_ED_FT
Abdominal Pain    Many things can cause abdominal pain. . Your health care provider will do a physical exam to determine if there is a dangerous cause of your pain; blood tests and imaging can sometimes help determine the cause of your pain. However, in many cases, no cause may be found and you may need further testing as an outpatient. Monitor your abdominal pain for any changes.     SEEK IMMEDIATE MEDICAL CARE IF YOU HAVE ANY OF THE FOLLOWING SYMPTOMS: worsening abdominal pain, uncontrollable vomiting, profuse diarrhea, inability to have bowel movements or pass gas, black or bloody stools, fever accompanying chest pain or back pain, or fainting. These symptoms may represent a serious problem that is an emergency. Do not wait to see if the symptoms will go away. Get medical help right away. Call 911 and do not drive yourself to the hospital.      Nausea / Vomiting    Nausea is the feeling that you have to vomit. As nausea gets worse, it can lead to vomiting. Vomiting puts you at an increased risk for dehydration. Older adults and people with other diseases or a weak immune system are at higher risk for dehydration. Drink clear fluids in small but frequent amounts as tolerated. Eat bland, easy-to-digest foods in small amounts as tolerated.    SEEK IMMEDIATE MEDICAL CARE IF YOU HAVE ANY OF THE FOLLOWING SYMPTOMS: fever, inability to keep sufficient fluids down, black or bloody vomitus, black or bloody stools, lightheadedness/dizziness, chest pain, severe headache, rash, shortness of breath, cold or clammy skin, confusion, pain with urination, or any signs of dehydration.    Diarrhea    Diarrhea is frequent loose or watery bowel movements that has many causes. Diarrhea can make you feel weak and cause you to become dehydrated. Diarrhea typically lasts 2–3 days, but can last longer if it is a sign of something more serious. Drink clear fluids to prevent dehydration. Eat bland, easy-to-digest foods as tolerated.     SEEK IMMEDIATE MEDICAL CARE IF YOU HAVE ANY OF THE FOLLOWING SYMPTOMS: high fevers, lightheadedness/dizziness, chest pain, black or bloody stools, shortness of breath, severe abdominal or back pain, or any signs of dehydration.      may use zofran that was given yesterday by your pcp.

## 2020-09-10 ENCOUNTER — EMERGENCY (EMERGENCY)
Facility: HOSPITAL | Age: 30
LOS: 0 days | Discharge: HOME | End: 2020-09-11
Attending: EMERGENCY MEDICINE | Admitting: EMERGENCY MEDICINE
Payer: MEDICAID

## 2020-09-10 VITALS
SYSTOLIC BLOOD PRESSURE: 123 MMHG | HEART RATE: 108 BPM | TEMPERATURE: 97 F | WEIGHT: 164.91 LBS | OXYGEN SATURATION: 99 % | RESPIRATION RATE: 18 BRPM | DIASTOLIC BLOOD PRESSURE: 70 MMHG | HEIGHT: 69 IN

## 2020-09-10 LAB
ALBUMIN SERPL ELPH-MCNC: 4.3 G/DL — SIGNIFICANT CHANGE UP (ref 3.5–5.2)
ALP SERPL-CCNC: 37 U/L — SIGNIFICANT CHANGE UP (ref 30–115)
ALT FLD-CCNC: 8 U/L — SIGNIFICANT CHANGE UP (ref 0–41)
ANION GAP SERPL CALC-SCNC: 11 MMOL/L — SIGNIFICANT CHANGE UP (ref 7–14)
AST SERPL-CCNC: 12 U/L — SIGNIFICANT CHANGE UP (ref 0–41)
BASOPHILS # BLD AUTO: 0.07 K/UL — SIGNIFICANT CHANGE UP (ref 0–0.2)
BASOPHILS NFR BLD AUTO: 0.4 % — SIGNIFICANT CHANGE UP (ref 0–1)
BILIRUB SERPL-MCNC: 0.8 MG/DL — SIGNIFICANT CHANGE UP (ref 0.2–1.2)
BUN SERPL-MCNC: 5 MG/DL — LOW (ref 10–20)
CALCIUM SERPL-MCNC: 9.6 MG/DL — SIGNIFICANT CHANGE UP (ref 8.5–10.1)
CHLORIDE SERPL-SCNC: 102 MMOL/L — SIGNIFICANT CHANGE UP (ref 98–110)
CO2 SERPL-SCNC: 25 MMOL/L — SIGNIFICANT CHANGE UP (ref 17–32)
CREAT SERPL-MCNC: 0.7 MG/DL — SIGNIFICANT CHANGE UP (ref 0.7–1.5)
EOSINOPHIL # BLD AUTO: 0.09 K/UL — SIGNIFICANT CHANGE UP (ref 0–0.7)
EOSINOPHIL NFR BLD AUTO: 0.5 % — SIGNIFICANT CHANGE UP (ref 0–8)
GLUCOSE SERPL-MCNC: 107 MG/DL — HIGH (ref 70–99)
HCG SERPL QL: NEGATIVE — SIGNIFICANT CHANGE UP
HCT VFR BLD CALC: 38.8 % — SIGNIFICANT CHANGE UP (ref 37–47)
HGB BLD-MCNC: 12.6 G/DL — SIGNIFICANT CHANGE UP (ref 12–16)
IMM GRANULOCYTES NFR BLD AUTO: 0.5 % — HIGH (ref 0.1–0.3)
LIDOCAIN IGE QN: 12 U/L — SIGNIFICANT CHANGE UP (ref 7–60)
LYMPHOCYTES # BLD AUTO: 1.28 K/UL — SIGNIFICANT CHANGE UP (ref 1.2–3.4)
LYMPHOCYTES # BLD AUTO: 6.8 % — LOW (ref 20.5–51.1)
MAGNESIUM SERPL-MCNC: 1.4 MG/DL — LOW (ref 1.8–2.4)
MCHC RBC-ENTMCNC: 31.7 PG — HIGH (ref 27–31)
MCHC RBC-ENTMCNC: 32.5 G/DL — SIGNIFICANT CHANGE UP (ref 32–37)
MCV RBC AUTO: 97.5 FL — SIGNIFICANT CHANGE UP (ref 81–99)
MONOCYTES # BLD AUTO: 0.5 K/UL — SIGNIFICANT CHANGE UP (ref 0.1–0.6)
MONOCYTES NFR BLD AUTO: 2.6 % — SIGNIFICANT CHANGE UP (ref 1.7–9.3)
NEUTROPHILS # BLD AUTO: 16.92 K/UL — HIGH (ref 1.4–6.5)
NEUTROPHILS NFR BLD AUTO: 89.2 % — HIGH (ref 42.2–75.2)
NRBC # BLD: 0 /100 WBCS — SIGNIFICANT CHANGE UP (ref 0–0)
PLATELET # BLD AUTO: 243 K/UL — SIGNIFICANT CHANGE UP (ref 130–400)
POTASSIUM SERPL-MCNC: 3.9 MMOL/L — SIGNIFICANT CHANGE UP (ref 3.5–5)
POTASSIUM SERPL-SCNC: 3.9 MMOL/L — SIGNIFICANT CHANGE UP (ref 3.5–5)
PROT SERPL-MCNC: 6.3 G/DL — SIGNIFICANT CHANGE UP (ref 6–8)
RBC # BLD: 3.98 M/UL — LOW (ref 4.2–5.4)
RBC # FLD: 13.1 % — SIGNIFICANT CHANGE UP (ref 11.5–14.5)
SODIUM SERPL-SCNC: 138 MMOL/L — SIGNIFICANT CHANGE UP (ref 135–146)
WBC # BLD: 18.95 K/UL — HIGH (ref 4.8–10.8)
WBC # FLD AUTO: 18.95 K/UL — HIGH (ref 4.8–10.8)

## 2020-09-10 PROCEDURE — 74177 CT ABD & PELVIS W/CONTRAST: CPT | Mod: 26

## 2020-09-10 PROCEDURE — 99285 EMERGENCY DEPT VISIT HI MDM: CPT

## 2020-09-10 RX ORDER — SODIUM CHLORIDE 9 MG/ML
1000 INJECTION, SOLUTION INTRAVENOUS ONCE
Refills: 0 | Status: COMPLETED | OUTPATIENT
Start: 2020-09-10 | End: 2020-09-10

## 2020-09-10 RX ORDER — FAMOTIDINE 10 MG/ML
20 INJECTION INTRAVENOUS ONCE
Refills: 0 | Status: COMPLETED | OUTPATIENT
Start: 2020-09-10 | End: 2020-09-10

## 2020-09-10 RX ORDER — DIPHENHYDRAMINE HYDROCHLORIDE AND LIDOCAINE HYDROCHLORIDE AND ALUMINUM HYDROXIDE AND MAGNESIUM HYDRO
30 KIT ONCE
Refills: 0 | Status: COMPLETED | OUTPATIENT
Start: 2020-09-10 | End: 2020-09-10

## 2020-09-10 RX ORDER — MAGNESIUM SULFATE 500 MG/ML
2 VIAL (ML) INJECTION ONCE
Refills: 0 | Status: DISCONTINUED | OUTPATIENT
Start: 2020-09-10 | End: 2020-09-10

## 2020-09-10 RX ORDER — MAGNESIUM SULFATE 500 MG/ML
2 VIAL (ML) INJECTION ONCE
Refills: 0 | Status: COMPLETED | OUTPATIENT
Start: 2020-09-10 | End: 2020-09-10

## 2020-09-10 RX ORDER — KETOROLAC TROMETHAMINE 30 MG/ML
15 SYRINGE (ML) INJECTION ONCE
Refills: 0 | Status: DISCONTINUED | OUTPATIENT
Start: 2020-09-10 | End: 2020-09-10

## 2020-09-10 RX ORDER — ONDANSETRON 8 MG/1
4 TABLET, FILM COATED ORAL ONCE
Refills: 0 | Status: COMPLETED | OUTPATIENT
Start: 2020-09-10 | End: 2020-09-10

## 2020-09-10 RX ORDER — MORPHINE SULFATE 50 MG/1
4 CAPSULE, EXTENDED RELEASE ORAL ONCE
Refills: 0 | Status: DISCONTINUED | OUTPATIENT
Start: 2020-09-10 | End: 2020-09-10

## 2020-09-10 RX ADMIN — ONDANSETRON 4 MILLIGRAM(S): 8 TABLET, FILM COATED ORAL at 21:32

## 2020-09-10 RX ADMIN — SODIUM CHLORIDE 1000 MILLILITER(S): 9 INJECTION, SOLUTION INTRAVENOUS at 21:32

## 2020-09-10 RX ADMIN — Medication 50 GRAM(S): at 23:27

## 2020-09-10 RX ADMIN — MORPHINE SULFATE 4 MILLIGRAM(S): 50 CAPSULE, EXTENDED RELEASE ORAL at 21:50

## 2020-09-10 RX ADMIN — SODIUM CHLORIDE 1000 MILLILITER(S): 9 INJECTION, SOLUTION INTRAVENOUS at 23:27

## 2020-09-10 RX ADMIN — DIPHENHYDRAMINE HYDROCHLORIDE AND LIDOCAINE HYDROCHLORIDE AND ALUMINUM HYDROXIDE AND MAGNESIUM HYDRO 30 MILLILITER(S): KIT at 21:34

## 2020-09-10 RX ADMIN — FAMOTIDINE 100 MILLIGRAM(S): 10 INJECTION INTRAVENOUS at 21:32

## 2020-09-10 RX ADMIN — MORPHINE SULFATE 4 MILLIGRAM(S): 50 CAPSULE, EXTENDED RELEASE ORAL at 21:32

## 2020-09-10 NOTE — ED PROVIDER NOTE - PROGRESS NOTE DETAILS
ED Attending SULMA Rawls  Pt reports pain improved but still sore, indicates senses pain in lower abd pain, no vomiting in ed, concerned for thickened bladder wall on ct ap, pt aware of providing urine, lactate to be sent, pelvic ultrasound added based on ct, called ultrasound tech. aware of pt. ED Attending SULMA Rawls  Pt endorsed to Dr. Salcedo, urine culture sent, ua negative, pending pelvic ultrasound results, reassess. Results d/w patient and family and copies of results provided.  Pt instructed to return if any worsening symptoms or concerns.  They verbalize understanding.

## 2020-09-10 NOTE — ED PROVIDER NOTE - PHYSICAL EXAMINATION
CONST: Pt appears uncomfortable  EYES: Sclera and conjunctiva clear.   ENT: No nasal discharge. Oropharynx normal appearing  NECK: Non-tender, no meningeal signs. normal ROM. supple   CARD: S1 S2; No jvd  RESP: Equal BS B/L, No wheezes, rhonchi or rales. No distress  GI: Diffuse abd tenderness. no cva tenderness. normal BS  MS: Normal ROM in all extremities. pulses 2 +. no calf tenderness or swelling  SKIN: Warm, dry, no acute rashes. Good turgor  NEURO: A&Ox3, No focal deficits.

## 2020-09-10 NOTE — ED PROVIDER NOTE - OBJECTIVE STATEMENT
29y F pmh kidney stones, gerd, pud presents for eval of abd pain. Pt had sudden onset of moderate sharp epigastric pain radiating through her abdomen, no aggravating or relieving factors. Associated n/v. Denies fever, ha, cp, sob, weakness, numbness, dysuria, hematuria

## 2020-09-10 NOTE — ED PROVIDER NOTE - CLINICAL SUMMARY MEDICAL DECISION MAKING FREE TEXT BOX
Case signed out to me by Dr. Rawls -- patient presnted with acute onset abdominal pain, vomiting x 1 -- found to have leukocytosis, multple cysts on CT scan, US confirmed non torsed ovarian cyst with FF suggestive of ruptured cyst. After toradol and observation pain resolved. Patient advised to follow up with primary GYN, return for new or worsening symptoms.

## 2020-09-10 NOTE — ED PROVIDER NOTE - PATIENT PORTAL LINK FT
You can access the FollowMyHealth Patient Portal offered by [facility] by registering at the following website: [email or web address]. By joining [employer]’s FollowMyHealth portal, you will also be able to view your health information using other applications (apps) compatible with our system.
stated

## 2020-09-10 NOTE — ED ADULT NURSE NOTE - NS ED NURSE DC PT EDUCATION RESOURCES
After Visit Summary   9/25/2017    Brandy Leon    MRN: [id number]           Patient Information     Date Of Birth          1942        Visit Information        Provider Department      9/25/2017 11:30 AM ANGELIQUE Locke MD [facility]         Follow-ups after your visit        Your next 10 appointments already scheduled     Dec 20, 2017  7:00 AM CST   Return Visit with Ehsan Araya DPM   [facility]    [address] [place] [id number] [phone]            Sep 25, 2018 10:45 AM CDT   Return Visit with Lucita Jordan MD   [facility]    [address] [place] [id number] [phone]              Who to contact     If you have questions or need follow up information about today's clinic visit or your schedule please contact [facility] directly at [phone].  Normal or non-critical lab and imaging results will be communicated to you by MyChart, letter or phone within 4 business days after the clinic has received the results. If you do not hear from us within 7 days, please contact the clinic through MyChart or phone. If you have a critical or abnormal lab result, we will notify you by phone as soon as possible.  Submit refill requests through [employer] or call your pharmacy and they will forward the refill request to us. Please allow 3 business days for your refill to be completed.          Additional Information About Your Visit        [employer] Information     [employer] is an electronic gateway that provides easy, online access to your medical records. With [employer], you can request a clinic appointment, read your test results, renew a prescription or communicate with your care team.     To sign up for [employer] visit the website at [email or web address]   You will be asked to enter the access code listed below, as well as  some personal information. Please follow the directions to create your username and password.     Your access code is: [email or web address]  Expires: 10/18/2017 11:38 AM     Your access code will  in 90 days. If you need help or a new code, please contact your [facility] or call [phone] for assistance.        Care EveryWhere ID     This is your Care EveryWhere ID. This could be used by other organizations to access your [place] medical records  [id number]         Blood Pressure from Last 3 Encounters:   17 128/78   17 102/60   17 146/76    Weight from Last 3 Encounters:   17 83.2 kg (183 lb 8 oz)   17 85.5 kg (188 lb 9.6 oz)   17 84.8 kg (187 lb)              Today, you had the following     No orders found for display         Today's Medication Changes          These changes are accurate as of: 17 11:56 AM.  If you have any questions, ask your nurse or doctor.               These medicines have changed or have updated prescriptions.        Dose/Directions    aspirin 81 MG EC tablet   This may have changed:  how much to take   Used for:  Type 2 diabetes, HbA1c goal < 7% (H)        Dose:  81 mg   Take 1 tablet by mouth daily.   Quantity:  90 tablet   Refills:  3       insulin glargine 100 UNIT/ML injection   Commonly known as:  LANTUS SOLOSTAR   This may have changed:  how much to take   Used for:  Type 2 diabetes mellitus with hyperglycemia, with long-term current use of insulin (H)        Dose:  26 Units   Inject 26 Units Subcutaneous At Bedtime   Quantity:  45 mL   Refills:  3                Primary Care Provider Office Phone # Fax #    MAGO Baker [facility] [phone]       [id number]  [place] [address]        Equal Access to Services     NUSRAT KHAN AH: Akbar Loya, waharshda yahaira, qaybta kaalmamary perez, mitch alaniz. So [facility]  [phone].    ATENCIÓN: Si stefan calhoun, tiene a garcía disposición servicios gratuitos de asistencia lingüística. Vesna lees [phone].    We comply with applicable federal civil rights laws and [place] laws. We do not discriminate on the basis of race, color, national origin, age, disability sex, sexual orientation or gender identity.            Thank you!     Thank you for choosing [facility]  for your care. Our goal is always to provide you with excellent care. Hearing back from our patients is one way we can continue to improve our services. Please take a few minutes to complete the written survey that you may receive in the mail after your visit with us. Thank you!             Your Updated Medication List - Protect others around you: Learn how to safely use, store and throw away your medicines at www.disposemymeds.org.          This list is accurate as of: 9/25/17 11:56 AM.  Always use your most recent med list.                   Brand Name Dispense Instructions for use Diagnosis    aspirin 81 MG EC tablet     90 tablet    Take 1 tablet by mouth daily.    Type 2 diabetes, HbA1c goal < 7% (H)       BD JUAN C U/F 32G X 4 MM   Generic drug:  insulin pen needle     100 each    USE 1 DAILY AS DIRECTED.    Type 2 diabetes mellitus with hyperglycemia (H)       canagliflozin 300 MG tablet    INVOKANA    30 tablet    Take 1 tablet (300 mg) by mouth every morning (before breakfast)    Type 2 diabetes mellitus with hyperglycemia, with long-term current use of insulin (H)       famotidine 20 MG tablet    PEPCID     Take 20 mg by mouth nightly as needed        fluticasone 50 MCG/ACT spray    FLONASE    16 g    Spray 2 sprays into both nostrils daily    Postnasal drip       glipiZIDE 10 MG tablet    GLUCOTROL    120 tablet    TAKE 2 TABLETS BY MOUTH TWICE A DAY BEFORE MEALS    Type 2 diabetes mellitus with hyperglycemia, with long-term current use of insulin (H)       GLUCOSAMINE CHONDROITIN COMPLX PO       2 Daily        insulin glargine 100 UNIT/ML injection    LANTUS SOLOSTAR    45 mL    Inject 26 Units Subcutaneous At Bedtime    Type 2 diabetes mellitus with hyperglycemia, with long-term current use of insulin (H)       irbesartan-hydrochlorothiazide 150-12.5 MG per tablet    AVALIDE    90 tablet    TAKE 1 TABLET BY MOUTH DAILY    Hypertension goal BP (blood pressure) < 140/90       latanoprost 0.005 % ophthalmic solution    XALATAN    3 Bottle    Place 1 drop into both eyes At Bedtime    Cataracts, bilateral, Primary open angle glaucoma of both eyes, moderate stage       metFORMIN 1000 MG tablet    GLUCOPHAGE    60 tablet    TAKE 1 TABLET (1,000 MG) BY MOUTH 2 TIMES DAILY (WITH MEALS)    Type 2 diabetes mellitus with hyperglycemia, with long-term current use of insulin (H)       MULTIVITAMIN PO      1 tablet daily        nitroFURantoin (macrocrystal-monohydrate) 100 MG capsule    MACROBID    14 capsule    Take 1 capsule (100 mg) by mouth 2 times daily    Dysuria       OMEGA 3 PO      Take by mouth 2 times daily.        ONE TOUCH DELICA LANCETS Misc     100 each    1 each 2 times daily. One Touch Delica    Type 2 diabetes, HbA1c goal < 7% (H)       ONE TOUCH ULTRA test strip   Generic drug:  blood glucose monitoring     200 strip    TEST TWICE DAILY    Type 2 diabetes mellitus with hyperglycemia, with long-term current use of insulin (H)       order for DME     1 each    Glucometer covered by insurance.    Type 2 diabetes, HbA1c goal < 7% (H)       oxybutynin 5 MG 24 hr tablet    DITROPAN-XL    70 tablet    TAKE 1 TABLET BY MOUTH DAILY FOR 1 WEEK THEN TAKE 2 TABLETS DAILY    Urinary frequency, Overactive bladder       rosuvastatin 20 MG tablet    CRESTOR    90 tablet    TAKE 1 TABLET (20 MG) BY MOUTH DAILY    Hyperlipidemia LDL goal <100       SYNTHROID 137 MCG tablet   Generic drug:  levothyroxine     90 tablet    Take 1 tablet (137 mcg) by mouth daily    Myxedema heart disease (H), Nutritional and metabolic  cardiomyopathy (H)       tiZANidine 4 MG tablet    ZANAFLEX    30 tablet    Take 1 tablet (4 mg) by mouth 3 times daily as needed for muscle spasms    Spasm of back muscles       triamcinolone 0.1 % cream    KENALOG    45 g    Apply sparingly to affected area two times daily.    Dermatitis          Yes

## 2020-09-10 NOTE — ED PROVIDER NOTE - NSFOLLOWUPINSTRUCTIONS_ED_ALL_ED_FT
Follow up with PMD and OBGYN in 1-2 days.    Ovarian Cyst    An ovarian cyst is a fluid-filled sac that forms on an ovary. The ovaries are small organs that produce eggs in women. Various types of cysts can form on the ovaries. Most are not cancerous. Many do not cause problems, and they often go away on their own. Some may cause symptoms and require treatment. Common types of ovarian cysts include:     Functional cysts—These cysts may occur every month during the menstrual cycle. This is normal. The cysts usually go away with the next menstrual cycle if the woman does not get pregnant. Usually, there are no symptoms with a functional cyst.  Endometrioma cysts—These cysts form from the tissue that lines the uterus. They are also called "chocolate cysts" because they become filled with blood that turns brown. This type of cyst can cause pain in the lower abdomen during intercourse and with your menstrual period.  Cystadenoma cysts—This type develops from the cells on the outside of the ovary. These cysts can get very big and cause lower abdomen pain and pain with intercourse. This type of cyst can twist on itself, cut off its blood supply, and cause severe pain. It can also easily rupture and cause a lot of pain.  Dermoid cysts—This type of cyst is sometimes found in both ovaries. These cysts may contain different kinds of body tissue, such as skin, teeth, hair, or cartilage. They usually do not cause symptoms unless they get very big.   Theca lutein cysts—These cysts occur when too much of a certain hormone (human chorionic gonadotropin) is produced and overstimulates the ovaries to produce an egg. This is most common after procedures used to assist with the conception of a baby (in vitro fertilization).    CAUSES  Fertility drugs can cause a condition in which multiple large cysts are formed on the ovaries. This is called ovarian hyperstimulation syndrome.   A condition called polycystic ovary syndrome can cause hormonal imbalances that can lead to nonfunctional ovarian cysts.     SIGNS AND SYMPTOMS  Many ovarian cysts do not cause symptoms. If symptoms are present, they may include:    Pelvic pain or pressure.  Pain in the lower abdomen.  Pain during sexual intercourse.  Increasing girth (swelling) of the abdomen.  Abnormal menstrual periods.  Increasing pain with menstrual periods.  Stopping having menstrual periods without being pregnant.    DIAGNOSIS  These cysts are commonly found during a routine or annual pelvic exam. Tests may be ordered to find out more about the cyst. These tests may include:    Ultrasound.  X-ray of the pelvis.  CT scan.  MRI.  Blood tests.    TREATMENT  Many ovarian cysts go away on their own without treatment. Your health care provider may want to check your cyst regularly for 2–3 months to see if it changes. For women in menopause, it is particularly important to monitor a cyst closely because of the higher rate of ovarian cancer in menopausal women. When treatment is needed, it may include any of the following:    A procedure to drain the cyst (aspiration). This may be done using a long needle and ultrasound. It can also be done through a laparoscopic procedure. This involves using a thin, lighted tube with a tiny camera on the end (laparoscope) inserted through a small incision.   Surgery to remove the whole cyst. This may be done using laparoscopic surgery or an open surgery involving a larger incision in the lower abdomen.   Hormone treatment or birth control pills. These methods are sometimes used to help dissolve a cyst.    HOME CARE INSTRUCTIONS  Only take over-the-counter or prescription medicines as directed by your health care provider.   Follow up with your health care provider as directed.   Get regular pelvic exams and Pap tests.    SEEK MEDICAL CARE IF:  Your periods are late, irregular, or painful, or they stop.  Your pelvic pain or abdominal pain does not go away.  Your abdomen becomes larger or swollen.  You have pressure on your bladder or trouble emptying your bladder completely.  You have pain during sexual intercourse.  You have feelings of fullness, pressure, or discomfort in your stomach.  You lose weight for no apparent reason.  You feel generally ill.  You become constipated.  You lose your appetite.  You develop acne.  You have an increase in body and facial hair.  You are gaining weight, without changing your exercise and eating habits.  You think you are pregnant.    SEEK IMMEDIATE MEDICAL CARE IF:  You have increasing abdominal pain.  You feel sick to your stomach (nauseous), and you throw up (vomit).  You develop a fever that comes on suddenly.  You have abdominal pain during a bowel movement.  Your menstrual periods become heavier than usual.

## 2020-09-10 NOTE — ED PROVIDER NOTE - NS ED ROS FT
Constitutional: (-) fever  Eyes/ENT: (-) blurry vision, (-) epistaxis  Cardiovascular: (-) chest pain, (-) syncope  Respiratory: (-) cough, (-) shortness of breath  Gastrointestinal: (+) abd pain, (+) vomiting, (-) diarrhea  : (-) dysuria, (-) hematuria  Musculoskeletal: (-) neck pain, (-) back pain, (-) joint pain  Integumentary: (-) rash, (-) edema  Neurological: (-) headache, (-) altered mental status  Allergic/Immunologic: (-) pruritus

## 2020-09-10 NOTE — ED PROVIDER NOTE - ATTENDING CONTRIBUTION TO CARE
30 y/o f w/ pmhx of GERD and kidney stone 28 y/o f w/ pmhx of GERD, PUD follows with GI Dr. Rodrigez (last EGD ~ 2 months ago) and kidney stones presents with few hours of epigastric sharp constant abd pain, radiating to generalized abd, associated with ~ 2 episodes of nausea and vomiting, nbnb. report was laying down watching tv when pain came on. does not feel like when she had a kidney stone. no vaginal bleeding/discharge, denies drug or etoh abuse. No fever, chills, cp,  pleuritic cp, sob, palpitations, diaphoresis, cough, ha/lh/dizziness, numbness/tingling, neck pain/ stiffness, diarrhea, constipation, melena/brbpr, urinary symptoms, trauma, weakness, edema, calf pain/swelling/erythema, sick contacts, recent travel or rash. lmp last week.     Vital Signs: I have reviewed the initial vital signs. Constitutional: Female pt sitting on stretcher speaking full sentences uncomfortable in fetal position crying form pain. Integumentary: No rash. ENT: Dry MM NECK: Supple, non-tender, no meningeal signs. Cardiovascular: Tachycardiac, radial pulses 2/4 b/l. No JVD. Respiratory: BS present b/l, ctabl, no wheezing or crackles, no accessory muscle use, no stridor. Gastrointestinal: BS present throughout all 4 quadrants, soft, nd, generalized abd pain to palpation worse to epigastric region, no rebound tenderness or guarding, no cvat. (-) Rios's (-) rovsing (-) obturator (-) psoas.  Musculoskeletal: FROM, no edema, no calf pain/swelling/erythema. Neurologic: AAOx3, motor 5/5 and sensation intact throughout upper and lower ext, CN II-XII intact, No facial droop or slurring of speech. No focal deficits.

## 2020-09-10 NOTE — ED PROVIDER NOTE - CARE PLAN
Assessment and plan of treatment:	Plan: Labs, ivf, pregnancy test, urine, pain control, imaging, reassess. Principal Discharge DX:	Ruptured cyst of ovary  Assessment and plan of treatment:	Plan: Labs, ivf, pregnancy test, urine, pain control, imaging, reassess.

## 2020-09-11 ENCOUNTER — INPATIENT (INPATIENT)
Facility: HOSPITAL | Age: 30
LOS: 0 days | Discharge: HOME | End: 2020-09-12
Attending: OBSTETRICS & GYNECOLOGY | Admitting: OBSTETRICS & GYNECOLOGY
Payer: MEDICAID

## 2020-09-11 VITALS
OXYGEN SATURATION: 98 % | HEIGHT: 69 IN | SYSTOLIC BLOOD PRESSURE: 118 MMHG | TEMPERATURE: 99 F | HEART RATE: 78 BPM | RESPIRATION RATE: 20 BRPM | DIASTOLIC BLOOD PRESSURE: 84 MMHG

## 2020-09-11 VITALS
HEART RATE: 71 BPM | RESPIRATION RATE: 18 BRPM | DIASTOLIC BLOOD PRESSURE: 57 MMHG | SYSTOLIC BLOOD PRESSURE: 100 MMHG | TEMPERATURE: 98 F | OXYGEN SATURATION: 100 %

## 2020-09-11 DIAGNOSIS — Z98.890 OTHER SPECIFIED POSTPROCEDURAL STATES: Chronic | ICD-10-CM

## 2020-09-11 LAB
ALBUMIN SERPL ELPH-MCNC: 4 G/DL — SIGNIFICANT CHANGE UP (ref 3.5–5.2)
ALP SERPL-CCNC: 35 U/L — SIGNIFICANT CHANGE UP (ref 30–115)
ALT FLD-CCNC: 6 U/L — SIGNIFICANT CHANGE UP (ref 0–41)
ANION GAP SERPL CALC-SCNC: 10 MMOL/L — SIGNIFICANT CHANGE UP (ref 7–14)
APPEARANCE UR: CLEAR — SIGNIFICANT CHANGE UP
AST SERPL-CCNC: 10 U/L — SIGNIFICANT CHANGE UP (ref 0–41)
BASOPHILS # BLD AUTO: 0.05 K/UL — SIGNIFICANT CHANGE UP (ref 0–0.2)
BASOPHILS NFR BLD AUTO: 0.3 % — SIGNIFICANT CHANGE UP (ref 0–1)
BILIRUB SERPL-MCNC: 0.7 MG/DL — SIGNIFICANT CHANGE UP (ref 0.2–1.2)
BILIRUB UR-MCNC: NEGATIVE — SIGNIFICANT CHANGE UP
BUN SERPL-MCNC: 6 MG/DL — LOW (ref 10–20)
CALCIUM SERPL-MCNC: 8.8 MG/DL — SIGNIFICANT CHANGE UP (ref 8.5–10.1)
CHLORIDE SERPL-SCNC: 104 MMOL/L — SIGNIFICANT CHANGE UP (ref 98–110)
CO2 SERPL-SCNC: 22 MMOL/L — SIGNIFICANT CHANGE UP (ref 17–32)
COLOR SPEC: YELLOW — SIGNIFICANT CHANGE UP
CREAT SERPL-MCNC: 0.6 MG/DL — LOW (ref 0.7–1.5)
DIFF PNL FLD: NEGATIVE — SIGNIFICANT CHANGE UP
EOSINOPHIL # BLD AUTO: 0.07 K/UL — SIGNIFICANT CHANGE UP (ref 0–0.7)
EOSINOPHIL NFR BLD AUTO: 0.4 % — SIGNIFICANT CHANGE UP (ref 0–8)
GLUCOSE SERPL-MCNC: 80 MG/DL — SIGNIFICANT CHANGE UP (ref 70–99)
GLUCOSE UR QL: NEGATIVE MG/DL — SIGNIFICANT CHANGE UP
HCT VFR BLD CALC: 36.2 % — LOW (ref 37–47)
HGB BLD-MCNC: 11.7 G/DL — LOW (ref 12–16)
IMM GRANULOCYTES NFR BLD AUTO: 0.4 % — HIGH (ref 0.1–0.3)
KETONES UR-MCNC: NEGATIVE — SIGNIFICANT CHANGE UP
LACTATE SERPL-SCNC: 0.8 MMOL/L — SIGNIFICANT CHANGE UP (ref 0.7–2)
LACTATE SERPL-SCNC: 1.2 MMOL/L — SIGNIFICANT CHANGE UP (ref 0.7–2)
LEUKOCYTE ESTERASE UR-ACNC: NEGATIVE — SIGNIFICANT CHANGE UP
LIDOCAIN IGE QN: 10 U/L — SIGNIFICANT CHANGE UP (ref 7–60)
LYMPHOCYTES # BLD AUTO: 1.73 K/UL — SIGNIFICANT CHANGE UP (ref 1.2–3.4)
LYMPHOCYTES # BLD AUTO: 10.9 % — LOW (ref 20.5–51.1)
MCHC RBC-ENTMCNC: 31.7 PG — HIGH (ref 27–31)
MCHC RBC-ENTMCNC: 32.3 G/DL — SIGNIFICANT CHANGE UP (ref 32–37)
MCV RBC AUTO: 98.1 FL — SIGNIFICANT CHANGE UP (ref 81–99)
MONOCYTES # BLD AUTO: 0.77 K/UL — HIGH (ref 0.1–0.6)
MONOCYTES NFR BLD AUTO: 4.8 % — SIGNIFICANT CHANGE UP (ref 1.7–9.3)
NEUTROPHILS # BLD AUTO: 13.23 K/UL — HIGH (ref 1.4–6.5)
NEUTROPHILS NFR BLD AUTO: 83.2 % — HIGH (ref 42.2–75.2)
NITRITE UR-MCNC: NEGATIVE — SIGNIFICANT CHANGE UP
NRBC # BLD: 0 /100 WBCS — SIGNIFICANT CHANGE UP (ref 0–0)
PH UR: 7 — SIGNIFICANT CHANGE UP (ref 5–8)
PLATELET # BLD AUTO: 214 K/UL — SIGNIFICANT CHANGE UP (ref 130–400)
POTASSIUM SERPL-MCNC: 4.1 MMOL/L — SIGNIFICANT CHANGE UP (ref 3.5–5)
POTASSIUM SERPL-SCNC: 4.1 MMOL/L — SIGNIFICANT CHANGE UP (ref 3.5–5)
PROT SERPL-MCNC: 5.8 G/DL — LOW (ref 6–8)
PROT UR-MCNC: NEGATIVE MG/DL — SIGNIFICANT CHANGE UP
RBC # BLD: 3.69 M/UL — LOW (ref 4.2–5.4)
RBC # FLD: 13.2 % — SIGNIFICANT CHANGE UP (ref 11.5–14.5)
SODIUM SERPL-SCNC: 136 MMOL/L — SIGNIFICANT CHANGE UP (ref 135–146)
SP GR SPEC: 1.01 — SIGNIFICANT CHANGE UP (ref 1.01–1.03)
UROBILINOGEN FLD QL: 0.2 MG/DL — SIGNIFICANT CHANGE UP (ref 0.2–0.2)
WBC # BLD: 15.92 K/UL — HIGH (ref 4.8–10.8)
WBC # FLD AUTO: 15.92 K/UL — HIGH (ref 4.8–10.8)

## 2020-09-11 PROCEDURE — 99222 1ST HOSP IP/OBS MODERATE 55: CPT

## 2020-09-11 PROCEDURE — 99285 EMERGENCY DEPT VISIT HI MDM: CPT

## 2020-09-11 PROCEDURE — 76830 TRANSVAGINAL US NON-OB: CPT | Mod: 26

## 2020-09-11 PROCEDURE — 71045 X-RAY EXAM CHEST 1 VIEW: CPT | Mod: 26

## 2020-09-11 PROCEDURE — 76856 US EXAM PELVIC COMPLETE: CPT | Mod: 26

## 2020-09-11 RX ORDER — SODIUM CHLORIDE 9 MG/ML
1000 INJECTION, SOLUTION INTRAVENOUS ONCE
Refills: 0 | Status: COMPLETED | OUTPATIENT
Start: 2020-09-11 | End: 2020-09-11

## 2020-09-11 RX ORDER — SUCRALFATE 1 G
10 TABLET ORAL
Qty: 0 | Refills: 0 | DISCHARGE

## 2020-09-11 RX ORDER — MORPHINE SULFATE 50 MG/1
2 CAPSULE, EXTENDED RELEASE ORAL ONCE
Refills: 0 | Status: DISCONTINUED | OUTPATIENT
Start: 2020-09-11 | End: 2020-09-11

## 2020-09-11 RX ORDER — KETOROLAC TROMETHAMINE 30 MG/ML
15 SYRINGE (ML) INJECTION ONCE
Refills: 0 | Status: DISCONTINUED | OUTPATIENT
Start: 2020-09-11 | End: 2020-09-11

## 2020-09-11 RX ORDER — MORPHINE SULFATE 50 MG/1
4 CAPSULE, EXTENDED RELEASE ORAL ONCE
Refills: 0 | Status: DISCONTINUED | OUTPATIENT
Start: 2020-09-11 | End: 2020-09-11

## 2020-09-11 RX ORDER — ONDANSETRON 8 MG/1
1 TABLET, FILM COATED ORAL
Qty: 0 | Refills: 0 | DISCHARGE

## 2020-09-11 RX ORDER — ACETAMINOPHEN 500 MG
1000 TABLET ORAL ONCE
Refills: 0 | Status: COMPLETED | OUTPATIENT
Start: 2020-09-11 | End: 2020-09-11

## 2020-09-11 RX ORDER — METHYLPHENIDATE HCL 5 MG
0 TABLET ORAL
Qty: 0 | Refills: 0 | DISCHARGE

## 2020-09-11 RX ADMIN — Medication 15 MILLIGRAM(S): at 17:00

## 2020-09-11 RX ADMIN — Medication 15 MILLIGRAM(S): at 00:57

## 2020-09-11 RX ADMIN — Medication 15 MILLIGRAM(S): at 16:42

## 2020-09-11 RX ADMIN — SODIUM CHLORIDE 1000 MILLILITER(S): 9 INJECTION, SOLUTION INTRAVENOUS at 16:41

## 2020-09-11 RX ADMIN — Medication 15 MILLIGRAM(S): at 00:34

## 2020-09-11 RX ADMIN — MORPHINE SULFATE 4 MILLIGRAM(S): 50 CAPSULE, EXTENDED RELEASE ORAL at 16:41

## 2020-09-11 RX ADMIN — Medication 400 MILLIGRAM(S): at 21:24

## 2020-09-11 RX ADMIN — MORPHINE SULFATE 4 MILLIGRAM(S): 50 CAPSULE, EXTENDED RELEASE ORAL at 17:00

## 2020-09-11 RX ADMIN — MORPHINE SULFATE 2 MILLIGRAM(S): 50 CAPSULE, EXTENDED RELEASE ORAL at 19:57

## 2020-09-11 NOTE — ED PROVIDER NOTE - ATTENDING CONTRIBUTION TO CARE
30 yo F pmh of ovarian cyst presents with pelvic pain. Pain started yesterday. Went to the [facility] ED and had blood work, sono and ct that showed a ruptured complex cyst. Patient was discharged. Saw her OB today Dr. troy Palm who advised her to go to the hospital for evaluation by gynecology. Patient reports N/V from the pain. no vaginal bleeding or discharge. LMP 2 weeks ago. no fevers. no urinary symptoms. Having 10/10, sharp, lower abdominal pain, worse in the LLQ, non radiating, constant.     CONSTITUTIONAL: Well-developed; well-nourished; in no acute distress.   SKIN: warm, dry  HEAD: Normocephalic; atraumatic.  EYES: PERRL, EOMI, no conjunctival erythema  ENT: No nasal discharge; airway clear.  NECK: Supple; non tender.  CARD: S1, S2 normal;  Regular rate and rhythm.   RESP: No wheezes, rales or rhonchi.  ABD: soft + diffuse tenderness worse in RLQ, LLQ, non distended, no rebound or guarding  EXT: Normal ROM.  5/5 strength in all 4 extremities   LYMPH: No acute cervical adenopathy.  NEURO: Alert, oriented, grossly unremarkable. neurovascularly intact  PSYCH: Cooperative, appropriate.

## 2020-09-11 NOTE — H&P ADULT - ASSESSMENT
28yo P1 with PMD GERD, LMP 8/27, now with abdominal pain likely secondary to ruptured ovarian cyst, low suspicion for torsion, clinically and hemodynamically stable  -admit to gyn for serial abdominal exams  -Discussed with patient outpatient vs. inpatient management. Due to level of pain, patient desires inpatient management at this time.    -IV toradol q6h  -IVF  -NPO  -ambulation as tolerated  -AM CBC ordered, f/u ucx  -f/u TVUS read    Dr. Badillo and Dr. Matthews aware.

## 2020-09-11 NOTE — H&P ADULT - NSHPLABSRESULTS_GEN_ALL_CORE
LABS:                        11.7   15.92 )-----------( 214      ( 11 Sep 2020 17:00 )             36.2             136  |  104  |  6<L>  ----------------------------<  80  4.1   |  22  |  0.6<L>    Ca    8.8      11 Sep 2020 17:00  Mg     1.4     09-10    TPro  5.8<L>  /  Alb  4.0  /  TBili  0.7  /  DBili  x   /  AST  10  /  ALT  6   /  AlkPhos  35      serum preg neg    Urinalysis Basic - ( 10 Sep 2020 21:14 )    Color: Yellow / Appearance: Clear / S.010 / pH: x  Gluc: x / Ketone: Negative  / Bili: Negative / Urobili: 0.2 mg/dL   Blood: x / Protein: Negative mg/dL / Nitrite: Negative   Leuk Esterase: Negative / RBC: x / WBC x   Sq Epi: x / Non Sq Epi: x / Bacteria: x          RADIOLOGY & ADDITIONAL STUDIES:   @1600 TVUS pending    < from: US Pelvis Complete (20 @ 00:24) >  EXAM:  US PELVIC COMPLETE       PROCEDURE DATE:  2020    INTERPRETATION:  CLINICAL HISTORY: Cyst, abdominal pain.  COMPARISON: CT abdomen pelvis 9/10/2020  PROCEDURE: Transabdominal and transvaginal ultrasound of the pelvis was performed, including Doppler.  LMP: 2020  FINDINGS:  UTERUS: Anteverted measuring 8.3 x 4.8 x 4.0, with normal echogenicity and morphology. The endometrial echo complex measures 0.46 cm, which is normal in thickness. Intrauterine device is visualized.  RIGHT OVARY: measures 3.7 x 1.9 x 1.5 cm, and is unremarkable. Doppler flow is demonstrated to the right ovary.  LEFT OVARY: measures 5.0 x 4.0 x 2.6 cm.. Left ovarian complex cyst measures 2.5 x 1.9 x 1.7 cm. Doppler flow is demonstrated to the left ovary.  OTHER: Small amount of pelvic free fluid is seen.  IMPRESSION:  Left ovarian 2.5 cm complex cyst and small amount of pelvic free fluid, correlate for ruptured cyst. IUD in position.      < from: CT Abdomen and Pelvis w/ IV Cont (09.10.20 @ 22:56) >  PROCEDURE DATE:  09/10/2020    INTERPRETATION:  CLINICAL STATEMENT: Abdominal pain.  TECHNIQUE: Contiguous axial CT images were obtained from the lower chest to the pubic symphysis following administration of intravenous contrast.  Oral contrast was not administered.  Reformatted images in the coronal and sagittal planes were acquired.  COMPARISON: 2020  FINDINGS:  LOWER CHEST: Unremarkable.  HEPATOBILIARY: Unremarkable.  SPLEEN: Unremarkable.  PANCREAS: Unremarkable.  ADRENAL GLANDS: Unremarkable.  KIDNEYS: Symmetric enhancement. No hydronephrosis.  ABDOMINOPELVIC NODES: Unremarkable.  PELVIC ORGANS: Multiple left adnexal cysts, largest measuring 3 cm with surrounding complex free fluid. IUD is present.  PERITONEUM/MESENTERY/BOWEL: No evidence for bowel obstruction, ascites, or pneumoperitoneum. Unremarkable appendix.  BONES/SOFT TISSUES: Unremarkable.  IMPRESSION:  Multiple left adnexal cysts with surrounding complex free fluid, possibly reflecting a ruptured cyst

## 2020-09-11 NOTE — ED PROVIDER NOTE - PHYSICAL EXAMINATION
CONSTITUTIONAL: Well-developed; well-nourished; uncomfortable due to pain  SKIN: warm, dry  HEAD: Normocephalic; atraumatic.  EYES: PERRL, EOMI, no conjunctival erythema  ENT: No nasal discharge; airway clear.  NECK: Supple; non tender.  CARD: S1, S2 normal; no murmurs, gallops, or rubs. Regular rate and rhythm.   RESP: No wheezes, rales or rhonchi.  ABD: + Abdominal pain   EXT: Normal ROM.  No clubbing, cyanosis or edema.   LYMPH: No acute cervical adenopathy.  NEURO: Alert, oriented, grossly unremarkable  PSYCH: Cooperative, appropriate.

## 2020-09-11 NOTE — ED PROVIDER NOTE - OBJECTIVE STATEMENT
29 y.o. F PMH of ovarian cyst with recent visit to the hospital with negative blood work CT showed ruptured complex cyst. Patient was discharged. Saw her OB today Dr. troy Palm who advised her to go to the hospital for evaluation by gynecology. No fever no chills. Pt reported having severe pain, has not improved, nausea without vomitting, no urinary symptoms.

## 2020-09-11 NOTE — ED PROVIDER NOTE - CLINICAL SUMMARY MEDICAL DECISION MAKING FREE TEXT BOX
Patient presents with lower abdominal pain. Was in the ED yesterday and had blood work, ct and ultrasound done. Found to have ruptured ovarian cyst. Discharged with ob follow up who sent her for re-evaluation. Patient seen by ob today who recommends admission for pain control.

## 2020-09-11 NOTE — ED ADULT TRIAGE NOTE - CHIEF COMPLAINT QUOTE
c/o abdominal pain and vomiting. Patient seen in [facility] ED last night. Hx erupted cyst

## 2020-09-11 NOTE — H&P ADULT - NSHPPHYSICALEXAM_GEN_ALL_CORE
Vital Signs Last 24 Hrs  T(C): 37.2 (11 Sep 2020 15:12), Max: 37.2 (11 Sep 2020 15:12)  T(F): 98.9 (11 Sep 2020 15:12), Max: 98.9 (11 Sep 2020 15:12)  HR: 78 (11 Sep 2020 15:12) (71 - 108)  BP: 118/84 (11 Sep 2020 15:12) (100/57 - 123/70)  RR: 20 (11 Sep 2020 15:12) (18 - 20)  SpO2: 98% (11 Sep 2020 15:12) (98% - 100%)    General Appearance - AAOx3, NAD  Heart - S1S2 regular rate and rhythm  Lung - CTA Bilaterally  Abdomen - Diffuse generalized tenderness, mild guarding.  Otherwise soft, nondistended, no rebound, no rigidity, no guarding, bowel sounds present    GYN/Pelvis:    Labia Majora - Normal  Labia Minora - Normal  Clitoris - Normal  Urethra - Normal  Vagina - Normal, no bleeding  Cervix - Normal, no CMT    Uterus:  Size - 6wk sized, anteverted  Tenderness - None  Mass - None  Freely mobile    Adnexa:  Masses - None  Tenderness - None

## 2020-09-11 NOTE — H&P ADULT - HISTORY OF PRESENT ILLNESS
Chief Complaint: abdominal pain    HPI: 30yo P1 with PMH of GERD, LMP , presenting to ED [place] for sudden onset left-sided abdominal pain starting last night. Denies recent abdominal trauma, intercourse or heavy lifting. Patient presented to [facility] ED last night, she was diagnosed with a ruptured ovarian cyst and sent home.  She saw her OBGYN Dr. Palm today who told her to go back to the ED for gyn evaluation.  Denies vaginal bleeding, vaginal discharge, dysuria, hematuria.  Pain is constant, throbbing, worse when lying supine, associated with nausea.  Pain relieved mildly with Toradol and morphine given in the ED, currently 7/10. Otherwise denies HA, CP, SOB, vomiting, diarrhea, constipation.  Last PO intake 3 hours ago, last BM this AM, last intercourse 2 weeks ago.    Ob/Gyn History:                   LMP -                   Cycle Length - q30d  Obhx: FT  x1, no complications  ETOP x2 with D&C  Gynhx: H/o ruptured ovarian cyst >10years ago. Denies history of uterine fibroids, abnormal paps, or STIs  Last Pap Smear - last year, wnl per patient

## 2020-09-12 ENCOUNTER — TRANSCRIPTION ENCOUNTER (OUTPATIENT)
Age: 30
End: 2020-09-12

## 2020-09-12 VITALS
RESPIRATION RATE: 18 BRPM | SYSTOLIC BLOOD PRESSURE: 110 MMHG | TEMPERATURE: 96 F | HEART RATE: 80 BPM | DIASTOLIC BLOOD PRESSURE: 53 MMHG

## 2020-09-12 LAB
CULTURE RESULTS: SIGNIFICANT CHANGE UP
HCT VFR BLD CALC: 31.7 % — LOW (ref 37–47)
HGB BLD-MCNC: 10.3 G/DL — LOW (ref 12–16)
MCHC RBC-ENTMCNC: 31.8 PG — HIGH (ref 27–31)
MCHC RBC-ENTMCNC: 32.5 G/DL — SIGNIFICANT CHANGE UP (ref 32–37)
MCV RBC AUTO: 97.8 FL — SIGNIFICANT CHANGE UP (ref 81–99)
NRBC # BLD: 0 /100 WBCS — SIGNIFICANT CHANGE UP (ref 0–0)
PLATELET # BLD AUTO: 187 K/UL — SIGNIFICANT CHANGE UP (ref 130–400)
RBC # BLD: 3.24 M/UL — LOW (ref 4.2–5.4)
RBC # FLD: 13.2 % — SIGNIFICANT CHANGE UP (ref 11.5–14.5)
SARS-COV-2 RNA SPEC QL NAA+PROBE: SIGNIFICANT CHANGE UP
SPECIMEN SOURCE: SIGNIFICANT CHANGE UP
WBC # BLD: 12.11 K/UL — HIGH (ref 4.8–10.8)
WBC # FLD AUTO: 12.11 K/UL — HIGH (ref 4.8–10.8)

## 2020-09-12 PROCEDURE — 99238 HOSP IP/OBS DSCHRG MGMT 30/<: CPT

## 2020-09-12 RX ORDER — IBUPROFEN 200 MG
1 TABLET ORAL
Qty: 28 | Refills: 0
Start: 2020-09-12 | End: 2020-09-18

## 2020-09-12 RX ORDER — OMEPRAZOLE 10 MG/1
1 CAPSULE, DELAYED RELEASE ORAL
Qty: 0 | Refills: 0 | DISCHARGE

## 2020-09-12 RX ORDER — ACETAMINOPHEN 500 MG
2 TABLET ORAL
Qty: 56 | Refills: 0
Start: 2020-09-12 | End: 2020-09-18

## 2020-09-12 NOTE — DISCHARGE NOTE PROVIDER - CARE PROVIDER_API CALL
Don Vasquez  OBSTETRICS AND GYNECOLOGY  [address] [place] [address]  Phone: [phone]  Fax: [phone]  Follow Up Time:

## 2020-09-12 NOTE — DISCHARGE NOTE PROVIDER - NSDCCPCAREPLAN_GEN_ALL_CORE_FT
PRINCIPAL DISCHARGE DIAGNOSIS  Diagnosis: Ruptured ovarian cyst  Assessment and Plan of Treatment: Follow up with your OBGYN at next scheduled appointment. If you have worsening pain, fevers, or vomiting, call your doctor or return to the ED.

## 2020-09-12 NOTE — DISCHARGE NOTE NURSING/CASE MANAGEMENT/SOCIAL WORK - PATIENT PORTAL LINK FT
You can access the FollowMyHealth Patient Portal offered by [facility] by registering at the following website: [email or web address]. By joining [facility]’s FollowMyHealth portal, you will also be able to view your health information using other applications (apps) compatible with our system.

## 2020-09-12 NOTE — DISCHARGE NOTE PROVIDER - NSFOLLOWUPCLINICS_GEN_ALL_ED_FT
[facility]  OB/GYN  [address] [place] [address]  Phone: [phone]  Fax:   Follow Up Time:

## 2020-09-12 NOTE — PROGRESS NOTE ADULT - SUBJECTIVE AND OBJECTIVE BOX
PGY 2 Note  Patient seen and evaluated at bedside. Doing well, no acute complaints. Reports significant improvement in pain, now 4/10 in intensity. Denies fever, chills, N/V, CP, SOB, urinary symptoms, vaginal bleeding or LE pain/swelling. Pain well controlled.    Physical exam:    Vital Signs Last 24 Hrs  T(F): 96.1 (12 Sep 2020 09:35), Max: 98.9 (11 Sep 2020 15:12)  HR: 80 (12 Sep 2020 09:35) (78 - 80)  BP: 110/53 (12 Sep 2020 09:35) (110/53 - 118/84)  RR: 18 (12 Sep 2020 09:35) (18 - 20)  SpO2: 98% (11 Sep 2020 15:12) (98% - 98%)    Gen: alert, oriented  CVS: RRR  Lungs: CTAB  Abdomen: Soft, mild suprapubic tenderness to palpation, non distended. No rebound, rigidity or guarding.   Perineum: no bleeding  Ext: No calf tenderness    Diet: Regular     MEDICATIONS  (STANDING):  ketorolac   Injectable 15 milliGRAM(s) IV Push every 6 hours  lactated ringers. 1000 milliLiter(s) (125 mL/Hr) IV Continuous <Continuous>    MEDICATIONS  (PRN):  acetaminophen   Tablet .. 650 milliGRAM(s) Oral every 6 hours PRN Mild Pain (1 - 3)      LABS:                        10.3   12.11 )-----------( 187      ( 12 Sep 2020 07:13 )             31.7                         11.7   15.92 )-----------( 214      ( 11 Sep 2020 17:00 )             36.2                         12.6   18.95 )-----------( 243      ( 10 Sep 2020 21:46 )             38.8       09-11-20 @ 17:00      136  |  104  |  6<L>  ----------------------------<  80  4.1   |  22  |  0.6<L>    09-10-20 @ 21:46      138  |  102  |  5<L>  ----------------------------<  107<H>  3.9   |  25  |  0.7        Ca    8.8      11 Sep 2020 17:00  Ca    9.6      10 Sep 2020 21:46  Mg     1.4<L>     09-10    TPro  5.8<L>  /  Alb  4.0  /  TBili  0.7  /  DBili  x   /  AST  10  /  ALT  6   /  AlkPhos  35  09-11-20 @ 17:00  TPro  6.3  /  Alb  4.3  /  TBili  0.8  /  DBili  x   /  AST  12  /  ALT  8   /  AlkPhos  37  09-10-20 @ 21:46             PGY 2 Note  Patient seen and evaluated at bedside. Doing well, no acute complaints. Reports significant improvement in pain, now 1/10 in intensity. Denies fever, chills, N/V, CP, SOB, urinary symptoms, vaginal bleeding or LE pain/swelling. Pain well controlled.    Physical exam:    Vital Signs Last 24 Hrs  T(F): 96.1 (12 Sep 2020 09:35), Max: 98.9 (11 Sep 2020 15:12)  HR: 80 (12 Sep 2020 09:35) (78 - 80)  BP: 110/53 (12 Sep 2020 09:35) (110/53 - 118/84)  RR: 18 (12 Sep 2020 09:35) (18 - 20)  SpO2: 98% (11 Sep 2020 15:12) (98% - 98%)    Gen: alert, oriented  CVS: RRR  Lungs: CTAB  Abdomen: Soft, mild suprapubic tenderness to palpation, non distended. No rebound, rigidity or guarding.   Perineum: no bleeding  Ext: No calf tenderness    Diet: Regular     MEDICATIONS  (STANDING):  ketorolac   Injectable 15 milliGRAM(s) IV Push every 6 hours  lactated ringers. 1000 milliLiter(s) (125 mL/Hr) IV Continuous <Continuous>    MEDICATIONS  (PRN):  acetaminophen   Tablet .. 650 milliGRAM(s) Oral every 6 hours PRN Mild Pain (1 - 3)      LABS:                        10.3   12.11 )-----------( 187      ( 12 Sep 2020 07:13 )             31.7                         11.7   15.92 )-----------( 214      ( 11 Sep 2020 17:00 )             36.2                         12.6   18.95 )-----------( 243      ( 10 Sep 2020 21:46 )             38.8       09-11-20 @ 17:00      136  |  104  |  6<L>  ----------------------------<  80  4.1   |  22  |  0.6<L>    09-10-20 @ 21:46      138  |  102  |  5<L>  ----------------------------<  107<H>  3.9   |  25  |  0.7        Ca    8.8      11 Sep 2020 17:00  Ca    9.6      10 Sep 2020 21:46  Mg     1.4<L>     09-10    TPro  5.8<L>  /  Alb  4.0  /  TBili  0.7  /  DBili  x   /  AST  10  /  ALT  6   /  AlkPhos  35  09-11-20 @ 17:00  TPro  6.3  /  Alb  4.3  /  TBili  0.8  /  DBili  x   /  AST  12  /  ALT  8   /  AlkPhos  37  09-10-20 @ 21:46

## 2020-09-12 NOTE — DISCHARGE NOTE PROVIDER - NSDCMRMEDTOKEN_GEN_ALL_CORE_FT
ibuprofen 600 mg oral tablet: 1 tab(s) orally every 6 hours, As Needed MDD:4 tabs   Tylenol Regular Strength 325 mg oral tablet: 2 tab(s) orally every 6 hours, As Needed -for moderate pain MDD:4 tabs

## 2020-09-12 NOTE — ED ADULT NURSE NOTE - CHIEF COMPLAINT QUOTE
c/o abdominal pain and vomiting. Patient seen in [facility] ED last night. Hx erupted cyst

## 2020-09-12 NOTE — PROGRESS NOTE ADULT - ASSESSMENT
A/P: 30yo P1, PMH GERD, LMP 8/27, with abdominal pain likely secondary to ruptured ovarian cyst, low suspicion for torsion, pain now improved, clinically and hemodynamically stable  - discussed findings with patient, likely ruptured ovarian cyst and symptoms will likely improve  -  recommended starting OCPs as patient has a history of ovarian cysts  - will send rx for PO pain meds  - IVF/regular diet  - ambulation as tolerated  - discharge home  - f/u with OBGYN at next scheduled appointment    Dr. Uribe and Dr. Augustine aware. A/P: 30yo P1, PMH GERD, LMP 8/27, with abdominal pain likely secondary to ruptured ovarian cyst, low suspicion for torsion, pain now improved, clinically and hemodynamically stable  - discussed findings with patient, likely ruptured ovarian cyst and symptoms continue to improve  -  recommended starting OCPs as patient has a history of ovarian cysts  - will send rx for PO pain meds  - IVF/regular diet  - ambulation as tolerated  - discharge home  - f/u with OBGYN at next scheduled appointment    Dr. Uribe and Dr. Augustine at bedside.

## 2020-09-14 DIAGNOSIS — N83.209 UNSPECIFIED OVARIAN CYST, UNSPECIFIED SIDE: ICD-10-CM

## 2020-09-14 DIAGNOSIS — R00.0 TACHYCARDIA, UNSPECIFIED: ICD-10-CM

## 2020-09-14 DIAGNOSIS — R10.9 UNSPECIFIED ABDOMINAL PAIN: ICD-10-CM

## 2020-09-14 PROBLEM — K21.9 GASTRO-ESOPHAGEAL REFLUX DISEASE WITHOUT ESOPHAGITIS: Chronic | Status: ACTIVE | Noted: 2020-05-14

## 2020-09-16 DIAGNOSIS — F17.210 NICOTINE DEPENDENCE, CIGARETTES, UNCOMPLICATED: ICD-10-CM

## 2020-09-16 DIAGNOSIS — N83.292 OTHER OVARIAN CYST, LEFT SIDE: ICD-10-CM

## 2020-09-16 DIAGNOSIS — R10.9 UNSPECIFIED ABDOMINAL PAIN: ICD-10-CM

## 2020-09-22 ENCOUNTER — LABORATORY RESULT (OUTPATIENT)
Age: 30
End: 2020-09-22

## 2020-09-23 ENCOUNTER — OUTPATIENT (OUTPATIENT)
Dept: OUTPATIENT SERVICES | Facility: HOSPITAL | Age: 30
LOS: 1 days | Discharge: HOME | End: 2020-09-23

## 2020-09-23 ENCOUNTER — RESULT CHARGE (OUTPATIENT)
Age: 30
End: 2020-09-23

## 2020-09-23 ENCOUNTER — APPOINTMENT (OUTPATIENT)
Dept: OBGYN | Facility: CLINIC | Age: 30
End: 2020-09-23
Payer: MEDICAID

## 2020-09-23 VITALS
SYSTOLIC BLOOD PRESSURE: 112 MMHG | DIASTOLIC BLOOD PRESSURE: 80 MMHG | HEIGHT: 68 IN | BODY MASS INDEX: 24.88 KG/M2 | WEIGHT: 164.13 LBS

## 2020-09-23 DIAGNOSIS — N83.209 UNSPECIFIED OVARIAN CYST, UNSPECIFIED SIDE: ICD-10-CM

## 2020-09-23 DIAGNOSIS — Z98.890 OTHER SPECIFIED POSTPROCEDURAL STATES: Chronic | ICD-10-CM

## 2020-09-23 DIAGNOSIS — K21.9 GASTRO-ESOPHAGEAL REFLUX DISEASE W/OUT ESOPHAGITIS: ICD-10-CM

## 2020-09-23 DIAGNOSIS — Z00.00 ENCOUNTER FOR GENERAL ADULT MEDICAL EXAMINATION W/OUT ABNORMAL FINDINGS: ICD-10-CM

## 2020-09-23 DIAGNOSIS — Z80.41 FAMILY HISTORY OF MALIGNANT NEOPLASM OF OVARY: ICD-10-CM

## 2020-09-23 LAB
BILIRUB UR QL STRIP: NORMAL
CLARITY UR: CLEAR
COLLECTION METHOD: NORMAL
GLUCOSE UR-MCNC: NORMAL
HCG UR QL: 0.2 EU/DL
HCG UR QL: NEGATIVE
HGB UR QL STRIP.AUTO: NORMAL
KETONES UR-MCNC: NORMAL
LEUKOCYTE ESTERASE UR QL STRIP: NORMAL
NITRITE UR QL STRIP: NORMAL
PH UR STRIP: 6
PROT UR STRIP-MCNC: NORMAL
QUALITY CONTROL: YES
SP GR UR STRIP: 1.01

## 2020-09-23 PROCEDURE — 99212 OFFICE O/P EST SF 10 MIN: CPT | Mod: 25

## 2020-09-23 PROCEDURE — 58301 REMOVE INTRAUTERINE DEVICE: CPT

## 2020-09-23 PROCEDURE — 99395 PREV VISIT EST AGE 18-39: CPT

## 2020-09-23 PROCEDURE — 76856 US EXAM PELVIC COMPLETE: CPT | Mod: 26

## 2020-09-23 RX ORDER — OMEPRAZOLE 40 MG/1
40 CAPSULE, DELAYED RELEASE ORAL
Refills: 0 | Status: ACTIVE | COMMUNITY

## 2020-09-23 RX ORDER — CEFTRIAXONE 250 MG/1
250 INJECTION, POWDER, FOR SOLUTION INTRAMUSCULAR; INTRAVENOUS
Qty: 1 | Refills: 0 | Status: COMPLETED | OUTPATIENT
Start: 2020-09-23

## 2020-09-23 RX ORDER — DOXYCYCLINE HYCLATE 100 MG/1
100 CAPSULE ORAL
Qty: 14 | Refills: 0 | Status: ACTIVE | COMMUNITY
Start: 2020-09-23 | End: 1900-01-01

## 2020-09-23 RX ADMIN — CEFTRIAXONE SODIUM 250 MG: 250 INJECTION, POWDER, FOR SOLUTION INTRAMUSCULAR; INTRAVENOUS at 00:00

## 2020-09-23 NOTE — DISCUSSION/SUMMARY
[FreeTextEntry1] : #1 health maintenance\par pt wants to start ocp will wait for PID to resolve\par \par #2 iud removed\par \par #3 pelvic pain, ruptured cyst no longer present\par sono done-see report\par likely pid\par told if fevers, worseining abd pain, nausea to ED\par ceftriaxone/doxy given\par f/u 2 weeks

## 2020-09-23 NOTE — PHYSICAL EXAM
[Appropriately responsive] : appropriately responsive [Alert] : alert [No Acute Distress] : no acute distress [No Lymphadenopathy] : no lymphadenopathy [Regular Rate Rhythm] : regular rate rhythm [No Murmurs] : no murmurs [Clear to Auscultation B/L] : clear to auscultation bilaterally [Soft] : soft [Non-tender] : non-tender [Non-distended] : non-distended [No HSM] : No HSM [No Lesions] : no lesions [No Mass] : no mass [Oriented x3] : oriented x3 [Examination Of The Breasts] : a normal appearance [No Masses] : no breast masses were palpable [Labia Majora] : normal [Labia Minora] : normal [Normal] : normal [Motion Tenderness] : motion tenderness [Tenderness] : tender [Enlarged ___ wks] : not enlarged [Adnexa Tenderness] : tender

## 2020-09-23 NOTE — PROCEDURE
[IUD Removal] : intrauterine device (IUD) removal [Infection] : infection [Risks] : risks [Benefits] : benefits [Alternatives] : alternatives [Speculum Placed] : speculum placed [IUD Removed - Forceps] : IUD removed - forceps [IUD Discarded] : IUD discarded [Tolerated Well] : Patient tolerated the procedure well [No Complications] : no complications [Heavy Vaginal Bleeding] : for heavy vaginal bleeding

## 2020-09-23 NOTE — HISTORY OF PRESENT ILLNESS
[Y] : Patient uses contraception [IUD] : has an intrauterine device [FreeTextEntry1] : 30 y/o  lmp-Last week of August. Here for annual and f/u from ED/Admission. Pt was in [facility] and diagnosed with a ruptured cyst on Sept 10-. Todl to f/u. Pt feels better reports less pain. Pt was vomitting in hospital now no vomitting.  No fevers, +discharge described as liquidy, +odor.  [PapSmeardate] : 2 yrs ago [TextBox_31] : no hx abnl

## 2020-09-28 ENCOUNTER — OUTPATIENT (OUTPATIENT)
Dept: OUTPATIENT SERVICES | Facility: HOSPITAL | Age: 30
LOS: 1 days | Discharge: HOME | End: 2020-09-28

## 2020-09-28 DIAGNOSIS — D64.9 ANEMIA, UNSPECIFIED: ICD-10-CM

## 2020-09-28 DIAGNOSIS — Z98.890 OTHER SPECIFIED POSTPROCEDURAL STATES: Chronic | ICD-10-CM

## 2020-09-28 LAB
A VAGINAE DNA VAG QL NAA+PROBE: ABNORMAL
BVAB2 DNA VAG QL NAA+PROBE: ABNORMAL
C KRUSEI DNA VAG QL NAA+PROBE: NEGATIVE
C TRACH RRNA SPEC QL NAA+PROBE: NEGATIVE
MEGA1 DNA VAG QL NAA+PROBE: ABNORMAL
N GONORRHOEA RRNA SPEC QL NAA+PROBE: NEGATIVE
T VAGINALIS RRNA SPEC QL NAA+PROBE: NEGATIVE

## 2020-09-29 ENCOUNTER — APPOINTMENT (OUTPATIENT)
Dept: HEMATOLOGY ONCOLOGY | Facility: CLINIC | Age: 30
End: 2020-09-29

## 2020-10-07 ENCOUNTER — APPOINTMENT (OUTPATIENT)
Dept: OBGYN | Facility: CLINIC | Age: 30
End: 2020-10-07
Payer: MEDICAID

## 2020-10-07 ENCOUNTER — OUTPATIENT (OUTPATIENT)
Dept: OUTPATIENT SERVICES | Facility: HOSPITAL | Age: 30
LOS: 1 days | Discharge: HOME | End: 2020-10-07

## 2020-10-07 VITALS
WEIGHT: 170.25 LBS | SYSTOLIC BLOOD PRESSURE: 108 MMHG | BODY MASS INDEX: 25.89 KG/M2 | DIASTOLIC BLOOD PRESSURE: 62 MMHG

## 2020-10-07 DIAGNOSIS — Z98.890 OTHER SPECIFIED POSTPROCEDURAL STATES: Chronic | ICD-10-CM

## 2020-10-07 DIAGNOSIS — Z87.42 PERSONAL HISTORY OF OTHER DISEASES OF THE FEMALE GENITAL TRACT: ICD-10-CM

## 2020-10-07 PROCEDURE — 99212 OFFICE O/P EST SF 10 MIN: CPT

## 2020-10-07 RX ORDER — NORELGESTROMIN AND ETHINYL ESTRADIOL 150; 35 UG/D; UG/D
150-35 PATCH TRANSDERMAL
Qty: 1 | Refills: 11 | Status: ACTIVE | COMMUNITY
Start: 2020-10-07 | End: 1900-01-01

## 2020-10-07 NOTE — HISTORY OF PRESENT ILLNESS
[FreeTextEntry1] : 28 y/o female for f/u of PID. Pt pain improving. No fever, no n/v,\par discussed hpv\par will need colpo 3 months after pid dx\par pt also wants birth control\par stopped smoking\par xulane offered and decided\par risks of dvt/pe/mi/stroke\par all questions answered

## 2020-10-07 NOTE — PHYSICAL EXAM
[Soft] : soft [Non-tender] : non-tender [Non-distended] : non-distended [No HSM] : No HSM [No Lesions] : no lesions [No Mass] : no mass

## 2020-11-02 ENCOUNTER — TRANSCRIPTION ENCOUNTER (OUTPATIENT)
Age: 30
End: 2020-11-02

## 2020-11-06 ENCOUNTER — TRANSCRIPTION ENCOUNTER (OUTPATIENT)
Age: 30
End: 2020-11-06

## 2021-01-06 ENCOUNTER — APPOINTMENT (OUTPATIENT)
Dept: OBGYN | Facility: CLINIC | Age: 31
End: 2021-01-06

## 2021-03-01 ENCOUNTER — OUTPATIENT (OUTPATIENT)
Dept: OUTPATIENT SERVICES | Facility: HOSPITAL | Age: 31
LOS: 1 days | Discharge: HOME | End: 2021-03-01

## 2021-03-01 ENCOUNTER — LABORATORY RESULT (OUTPATIENT)
Age: 31
End: 2021-03-01

## 2021-03-01 ENCOUNTER — APPOINTMENT (OUTPATIENT)
Dept: OBGYN | Facility: CLINIC | Age: 31
End: 2021-03-01
Payer: MEDICAID

## 2021-03-01 VITALS
WEIGHT: 176 LBS | DIASTOLIC BLOOD PRESSURE: 70 MMHG | SYSTOLIC BLOOD PRESSURE: 110 MMHG | BODY MASS INDEX: 26.67 KG/M2 | HEIGHT: 68 IN

## 2021-03-01 DIAGNOSIS — Z98.890 OTHER SPECIFIED POSTPROCEDURAL STATES: Chronic | ICD-10-CM

## 2021-03-01 LAB — HPV HIGH+LOW RISK DNA PNL CVX: DETECTED

## 2021-03-01 PROCEDURE — 57454 BX/CURETT OF CERVIX W/SCOPE: CPT

## 2021-03-01 NOTE — PROCEDURE
[Colposcopy] : Colposcopy  [Risks] : risks [Benefits] : benefits [Patient] : patient [Infection] : infection [Bleeding] : bleeding [Allergic Reaction] : allergic reaction [HPV High Risk] : HPV high risk [Colposcopy Adequate] : colposcopy adequate [Pap Performed] : pap not performed [SCI Fully Visualized] : SCI fully visualized [ECC Performed] : ECC performed [No Abnormalities] : no abnormalities [Biopsy] : biopsy taken [Hemostasis Obtained] : Hemostasis obtained [Tolerated Well] : the patient tolerated the procedure well [de-identified] : 1 [de-identified] : 8 oclock we [de-identified] : 8

## 2021-03-02 ENCOUNTER — NON-APPOINTMENT (OUTPATIENT)
Age: 31
End: 2021-03-02

## 2021-03-08 NOTE — ED PROVIDER NOTE - CARE PLAN
Kevin Auguste is a 75 y.o. female and presents with Transitions Of Care  . Subjective:  Mrs. Della Fine returns to the office today and transition of care subsequent to a hospitalization at [address] [place] [address] from 2/3 until 2/12 when she was hospitalized with acute hypoxic respiratory failure secondary to acute on chronic systolic heart failure related to known coronary artery disease for which she has an ischemic cardiomyopathy. The patient's medications were adjusted during that course of time and she was diuresed over 30 pounds of fluid. She was discharged home off of oxygen on the medications as listed. She has had no increase in weight over time. She denies PND, orthopnea, cough or wheezing. The patient does have chronic lymphedema of the lower extremities and has been using a lymphedema pump twice a day. She states that that her atrial fibrillation has been under control and she was started on carvedilol while at [facility]. Her blood sugars remained under control and she has been checking them at home with average fasting blood sugars around 150. She denies any hypoglycemia. The patient does have chronic atrial fibrillation and has had no palpitations or syncope. She continues on Pradaxa for stroke prevention. She is due to see her cardiologist tomorrow.     Past Medical History:   Diagnosis Date    Anxiety 1/22/2018    Arthritis     OA    Asthma     Diabetes ([address]     Essential hypertension     GERD (gastroesophageal reflux disease)     Hypercholesterolemia 1/22/2018    Hypertension     Ill-defined condition     diverticulitis    Long-term use of high-risk medication 1/22/2018    Neuropathy     Obesity (BMI 30-39.9) 4/30/2019    Other ill-defined conditions(799.89)     IBS, spinal stenosis    Plantar fasciitis     Psychiatric disorder     depression, anxiety    Sleep apnea     uses CPAP    Type 2 diabetes mellitus with diabetic neuropathy, without long-term current use of insulin ([address] 6/5/2016     Past Surgical History:   Procedure Laterality Date    COLONOSCOPY N/A 3/14/2018    COL[facility] Lu [facility] ENDOSCOPY    HX APPENDECTOMY      HX CHOLECYSTECTOMY  11/15/2018    HX HYSTERECTOMY      HX PACEMAKER      IR KYPHOPLASTY LUMBAR  12/22/2020    IR KYPHOPLASTY THORACIC  1/6/2021    NH CARDIAC SURG PROCEDURE UNLIST      stent     Allergies   Allergen Reactions    Sulfa (Sulfonamide Antibiotics) Swelling    Amoxicillin Swelling     Current Outpatient Medications   Medication Sig Dispense Refill    carvediloL (COREG) 25 mg tablet Take 25 mg by mouth two (2) times daily (with meals).  torsemide (DEMADEX) 20 mg tablet Take 20 mg by mouth daily.  ferrous sulfate 325 mg (65 mg iron) tablet Take  by mouth every other day.  cyclobenzaprine (FLEXERIL) 10 mg tablet TAKE 1 TABLET BY MOUTH THREE TIMES DAILY AS NEEDED FOR MUSCLE SPASM 90 Tab 0    hydrALAZINE (APRESOLINE) 50 mg tablet Take 1 Tab by mouth two (2) times a day. (Patient taking differently: Take 75 mg by mouth two (2) times a day.) 30 Tab 0    eucalyptus-peppermint oil (Ponaris) soln 1-2 Drops by Nasal route nightly as needed.  pregabalin (Lyrica) 150 mg capsule Take 150 mg by mouth two (2) times a day.  clonazePAM (KlonoPIN) 0.5 mg tablet TAKE 1 TABLET BY MOUTH NIGHTLY . DO NOT EXCEED  0.5MG  PER  DAY 90 Tab 0    triamcinolone acetonide (KENALOG) 0.1 % topical cream APPLY A THIN FILM OF CREAM EXTERNALLY TO AFFECTED AREA TWICE DAILY 15 g 3    isosorbide mononitrate ER (IMDUR) 60 mg CR tablet Take 1 Tab by mouth daily. 90 Tab 3    magnesium oxide (MAG-OX) 400 mg tablet Take 1 tablet by mouth twice daily (Patient taking differently: daily. ) 180 Tab 3    omeprazole (PRILOSEC) 40 mg capsule TAKE 1 CAPSULE EVERY DAY 90 Cap 3    potassium chloride SR (KLOR-CON 10) 10 mEq tablet Take 1 Tab by mouth three (3) times daily.  540 Tab 3    SYMBICORT 160-4.5 mcg/actuation HFAA INHALE 2 PUFFS BY MOUTH TWICE DAILY 1 Inhaler 3    albuterol (PROVENTIL HFA, VENTOLIN HFA, PROAIR HFA) 90 mcg/actuation inhaler Take 1 Puff by inhalation every four (4) hours as needed for Wheezing. (Patient taking differently: Take 1 Puff by inhalation two (2) times daily as needed for Wheezing.) 1 Inhaler 6    traMADol (ULTRAM) 50 mg tablet Take 50 mg by mouth daily as needed for Pain (Used to supplement the Lyrica when lyrica doesnt manage the pain on its own).  dabigatran etexilate (PRADAXA) 150 mg capsule Take 1 Cap by mouth two (2) times a day. IF NO BLEEDING AT CATH SITE. 60 Cap 12    acetaminophen (TYLENOL EXTRA STRENGTH) 500 mg tablet Take 1,000 mg by mouth every eight (8) hours as needed for Pain (Headache).  lidocaine (ASPERCREME, LIDOCAINE,) 4 % topical cream Apply  to affected area daily as needed for Pain (Knee pain).  sodium chloride (AYR SALINE) 0.65 % nasal squeeze bottle 2 Sprays by Both Nostrils route every eight (8) hours as needed.  conjugated estrogens (PREMARIN) 0.625 mg/gram vaginal cream Insert 0.5 g into vagina two (2) times a week. Tuesdays and Thursdays       venlafaxine-SR (EFFEXOR XR) 150 mg capsule Take 150 mg by mouth two (2) times daily (with meals).  cholecalciferol (Vitamin D3) 25 mcg (1,000 unit) cap Take 1,000 Units by mouth daily.  aspirin 81 mg chewable tablet Take 81 mg by mouth daily.  atorvastatin (LIPITOR) 40 mg tablet Take 1 Tab by mouth nightly. 30 Tab 12    diphenhydrAMINE (BENADRYL) 25 mg capsule Take 25 mg by mouth two (2) times a day.        Social History     Socioeconomic History    Marital status:      Spouse name: Not on file    Number of children: Not on file    Years of education: Not on file    Highest education level: Not on file   Tobacco Use    Smoking status: Never Smoker    Smokeless tobacco: Never Used   Substance and Sexual Activity    Alcohol use: No    Drug use: No     Family History   Problem Relation Age of Onset [facility] Arthritis-osteo Mother     Hypertension Mother     High Cholesterol Mother     Crohn's Disease Mother     Heart Disease Mother     Alcohol abuse Father     High Cholesterol Sister     Hypertension Sister     Thyroid Disease Sister     COPD Sister     High Cholesterol Brother     Hypertension Brother     COPD Brother     COPD Child     Inflammatory Bowel Dz Child        Health Maintenance   Topic Date Due    COVID-19 Vaccine (1 of 2) Never done    DTaP/Tdap/Td series (1 - Tdap) Never done    Shingrix Vaccine Age 50> (1 of 2) Never done    Breast Cancer Screen Mammogram  04/04/2020    Pneumococcal 65+ years (2 of 2 - PPSV23) 10/11/2020    Medicare Yearly Exam  05/01/2021    MICROALBUMIN Q1  05/01/2021    Lipid Screen  08/10/2021    Foot Exam Q1  10/28/2021    GLAUCOMA SCREENING Q2Y  12/11/2021    Eye Exam Retinal or Dilated  12/11/2021    A1C test (Diabetic or Prediabetic)  12/15/2021    Colorectal Cancer Screening Combo  03/14/2028    Hepatitis C Screening  Completed    Bone Densitometry (Dexa) Screening  Completed    Flu Vaccine  Completed        Review of Systems  Constitutional: negative for fevers, chills, anorexia and weight loss  Eyes:   negative for visual disturbance and irritation  ENT:   negative for tinnitus,sore throat,nasal congestion,ear pain,hoarseness  Respiratory:  negative for cough, hemoptysis, dyspnea,wheezing  CV:   negative for chest pain, palpitations.   Positive for chronic lower extremity edema   GI:   negative for nausea, vomiting, diarrhea, abdominal pain,melena  Endo:               negative for polyuria,polydipsia,polyphagia,heat intolerance  Genitourinary: negative for frequency, dysuria and hematuria  Integumentary: negative for rash and pruritus  Hematologic:  negative for easy bruising and gum/nose bleeding  Musculoskel: negative for myalgias, arthralgias, back pain, muscle weakness, joint pain  Neurological:  negative for headaches, dizziness, vertigo, memory problems and gait   Behavl/Psych: negative for feelings of anxiety, depression, mood changes  ROS otherwise negative      Objective:  Visit Vitals  /62 (BP 1 Location: Right upper arm, BP Patient Position: Sitting, BP Cuff Size: Large adult)   Pulse 80   Temp 97.2 °F (36.2 °C) (Oral)   Resp 18   Ht 5' 9\" (1.753 m)   Wt 248 lb (112.5 kg)   SpO2 96%   BMI 36.62 kg/m²     Body mass index is 36.62 kg/m². Physical Exam:   General appearance - alert, well appearing, and in no distress  Mental status - alert, oriented to person, place, and time  EYE-VIVEK, EOMI,conjunctiva normal bilaterally, lids normal  ENT-ENT exam normal, no neck nodes or sinus tenderness  Nose - normal and patent, no erythema,  Or discharge   Mouth - mucous membranes moist, pharynx normal without lesions  Neck - supple, no significant adenopathy or bruit  Chest - clear to auscultation, no wheezes, rales or rhonchi. Heart - normal rate, regular rhythm, normal S1, S2, no murmurs, rubs, clicks or gallops   Abdomen - soft, nontender, nondistended, no masses or organomegaly  Lymph- no adenopathy palpable  Ext-2+ lymphedema present bilaterally  Skin-Warm and dry. no hyperpigmentation, vitiligo, or suspicious lesions  Neuro -alert, oriented, normal speech, no focal findings or movement disorder noted      Assessment/Plan:  Diagnoses and all orders for this visit:    Systolic CHF, chronic (HCC)    Ischemic cardiomyopathy    Essential hypertension  -     COLLECTION VENOUS BLOOD,VENIPUNCTURE  -     CBC WITH AUTOMATED DIFF  -     LIPID PANEL  -     METABOLIC PANEL, COMPREHENSIVE  -     TSH 3RD GENERATION    Chronic atrial fibrillation (HCC)    Type 2 diabetes mellitus with diabetic neuropathy, without long-term current use of insulin (HCC)  -     HEMOGLOBIN A1C W/O EAG        Other instructions: The patient's medications were reviewed and reconciled. No change in her current medical regimen will be made.     A no added salt, prudent diet is encouraged    Continue to check blood sugars once daily    Continue to check weights daily with instructions to notify us should weight go up 5 pounds    Continue to use the lymphedema pump    Await results of multiple labs    Follow-up in 3 months    Follow-up and Dispositions    · Return in about 3 months (around 6/8/2021). I have reviewed with the patient details of the assessment and plan and all questions were answered. Relevent patient education was performed. The most recent lab findings were reviewed with the patient. An After Visit Summary was printed and given to the patient. Bora Alvarado MD    Please note that this dictation was completed with [employer], the computer voice recognition software. Quite often unanticipated grammatical, syntax, homophones, and other interpretive errors are inadvertently transcribed by the computer software. Please disregard these errors. Please excuse any errors that have escaped final proofreading. Principal Discharge DX:	Flank pain

## 2021-03-24 ENCOUNTER — RESULT CHARGE (OUTPATIENT)
Age: 31
End: 2021-03-24

## 2021-03-24 ENCOUNTER — APPOINTMENT (OUTPATIENT)
Dept: OBGYN | Facility: CLINIC | Age: 31
End: 2021-03-24
Payer: MEDICAID

## 2021-03-24 ENCOUNTER — OUTPATIENT (OUTPATIENT)
Dept: OUTPATIENT SERVICES | Facility: HOSPITAL | Age: 31
LOS: 1 days | Discharge: HOME | End: 2021-03-24

## 2021-03-24 VITALS
SYSTOLIC BLOOD PRESSURE: 100 MMHG | DIASTOLIC BLOOD PRESSURE: 66 MMHG | HEIGHT: 68 IN | BODY MASS INDEX: 26.83 KG/M2 | WEIGHT: 177 LBS

## 2021-03-24 DIAGNOSIS — Z98.890 OTHER SPECIFIED POSTPROCEDURAL STATES: Chronic | ICD-10-CM

## 2021-03-24 DIAGNOSIS — R87.810 CERVICAL HIGH RISK HUMAN PAPILLOMAVIRUS (HPV) DNA TEST POSITIVE: ICD-10-CM

## 2021-03-24 PROCEDURE — 58300 INSERT INTRAUTERINE DEVICE: CPT

## 2021-03-24 PROCEDURE — 99212 OFFICE O/P EST SF 10 MIN: CPT | Mod: 25

## 2021-03-24 NOTE — PROCEDURE
[IUD Placement] : intrauterine device (IUD) placement [Prevention of Pregnancy] : prevention of pregnancy [Risks] : risks [Benefits] : benefits [Alternatives] : alternatives [Patient] : patient [Infection] : infection [Bleeding] : bleeding [Pain] : pain [Expulsion] : expulsion [Failure] : failure [Uterine Perforation] : uterine perforation [Neg Pregnancy Test] : negative pregnancy test [Betadine] : Betadine [Easy Passage] : Easy passage [ParaGard IUD] : ParaGard IUD [Tolerated Well] : Patient tolerated the procedure well [No Complications] : No complications [None] : None

## 2021-03-24 NOTE — HISTORY OF PRESENT ILLNESS
[FreeTextEntry1] : 31 y/o female here for discusioion of hpv and iud insert\par \par +hpv\par colpo bx neg, ecc neg\par discussed progression, regression and suppresion, discussed cotesting\par \par

## 2021-03-26 LAB — HCG UR QL: NEGATIVE

## 2021-04-28 ENCOUNTER — OUTPATIENT (OUTPATIENT)
Dept: OUTPATIENT SERVICES | Facility: HOSPITAL | Age: 31
LOS: 1 days | Discharge: HOME | End: 2021-04-28

## 2021-04-28 ENCOUNTER — APPOINTMENT (OUTPATIENT)
Dept: OBGYN | Facility: CLINIC | Age: 31
End: 2021-04-28
Payer: MEDICAID

## 2021-04-28 VITALS
SYSTOLIC BLOOD PRESSURE: 110 MMHG | HEIGHT: 68 IN | BODY MASS INDEX: 26.22 KG/M2 | WEIGHT: 173 LBS | DIASTOLIC BLOOD PRESSURE: 70 MMHG

## 2021-04-28 DIAGNOSIS — Z30.432 ENCOUNTER FOR REMOVAL OF INTRAUTERINE CONTRACEPTIVE DEVICE: ICD-10-CM

## 2021-04-28 DIAGNOSIS — Z98.890 OTHER SPECIFIED POSTPROCEDURAL STATES: Chronic | ICD-10-CM

## 2021-04-28 DIAGNOSIS — Z30.430 ENCOUNTER FOR INSERTION OF INTRAUTERINE CONTRACEPTIVE DEVICE: ICD-10-CM

## 2021-04-28 DIAGNOSIS — Z97.5 PRESENCE OF (INTRAUTERINE) CONTRACEPTIVE DEVICE: ICD-10-CM

## 2021-04-28 PROCEDURE — 99212 OFFICE O/P EST SF 10 MIN: CPT

## 2021-04-28 NOTE — PHYSICAL EXAM
[Labia Majora] : normal [Labia Minora] : normal [Normal] : normal [Uterine Adnexae] : normal [FreeTextEntry5] : iud string easily seen

## 2021-08-01 ENCOUNTER — LABORATORY RESULT (OUTPATIENT)
Age: 31
End: 2021-08-01

## 2021-08-05 NOTE — ED ADULT TRIAGE NOTE - PATIENT ON (OXYGEN DELIVERY METHOD)
Quality 47: Advance Care Plan: Advance Care Planning discussed and documented; advance care plan or surrogate decision maker documented in the medical record. room air Quality 226: Preventive Care And Screening: Tobacco Use: Screening And Cessation Intervention: Patient screened for tobacco use and is an ex/non-smoker Detail Level: Detailed Quality 431: Preventive Care And Screening: Unhealthy Alcohol Use - Screening: Patient screened for unhealthy alcohol use using a single question and scores less than 2 times per year

## 2021-10-19 ENCOUNTER — APPOINTMENT (OUTPATIENT)
Dept: OBGYN | Facility: CLINIC | Age: 31
End: 2021-10-19
Payer: MEDICAID

## 2021-10-19 ENCOUNTER — OUTPATIENT (OUTPATIENT)
Dept: OUTPATIENT SERVICES | Facility: HOSPITAL | Age: 31
LOS: 1 days | Discharge: HOME | End: 2021-10-19

## 2021-10-19 ENCOUNTER — RESULT CHARGE (OUTPATIENT)
Age: 31
End: 2021-10-19

## 2021-10-19 VITALS
DIASTOLIC BLOOD PRESSURE: 60 MMHG | HEIGHT: 68 IN | BODY MASS INDEX: 26.22 KG/M2 | SYSTOLIC BLOOD PRESSURE: 100 MMHG | WEIGHT: 173 LBS

## 2021-10-19 DIAGNOSIS — Z98.890 OTHER SPECIFIED POSTPROCEDURAL STATES: Chronic | ICD-10-CM

## 2021-10-19 PROCEDURE — 99213 OFFICE O/P EST LOW 20 MIN: CPT

## 2021-10-19 RX ORDER — SULFAMETHOXAZOLE AND TRIMETHOPRIM 800; 160 MG/1; MG/1
800-160 TABLET ORAL TWICE DAILY
Qty: 14 | Refills: 0 | Status: ACTIVE | COMMUNITY
Start: 2021-10-19 | End: 1900-01-01

## 2021-10-19 NOTE — DISCUSSION/SUMMARY
[FreeTextEntry1] : Pt has pain\par udip Blood\par will treat for uti\par + AUB-send for sono-if neg may remove IUD\par will f/u

## 2021-10-19 NOTE — HISTORY OF PRESENT ILLNESS
[FreeTextEntry1] : 31y/o female here with pain\par Started midline x months.\par +aub\par no fever, no dsicharge

## 2021-10-19 NOTE — PHYSICAL EXAM
[Appropriately responsive] : appropriately responsive [Alert] : alert [No Acute Distress] : no acute distress [No Lymphadenopathy] : no lymphadenopathy [Regular Rate Rhythm] : regular rate rhythm [No Murmurs] : no murmurs [Clear to Auscultation B/L] : clear to auscultation bilaterally [Soft] : soft [Non-tender] : non-tender [Non-distended] : non-distended [No HSM] : No HSM [No Lesions] : no lesions [No Mass] : no mass [Oriented x3] : oriented x3 [Examination Of The Breasts] : a normal appearance [No Masses] : no breast masses were palpable [Labia Majora] : normal [Labia Minora] : normal [Normal] : normal [Uterine Adnexae] : normal [FreeTextEntry5] : iud in place

## 2021-10-20 LAB
HCG UR QL: NEGATIVE
QUALITY CONTROL: YES

## 2021-10-26 ENCOUNTER — OUTPATIENT (OUTPATIENT)
Dept: OUTPATIENT SERVICES | Facility: HOSPITAL | Age: 31
LOS: 1 days | Discharge: HOME | End: 2021-10-26
Payer: MEDICAID

## 2021-10-26 DIAGNOSIS — Z98.890 OTHER SPECIFIED POSTPROCEDURAL STATES: Chronic | ICD-10-CM

## 2021-10-26 DIAGNOSIS — N39.0 URINARY TRACT INFECTION, SITE NOT SPECIFIED: ICD-10-CM

## 2021-10-26 PROCEDURE — 76830 TRANSVAGINAL US NON-OB: CPT | Mod: 26

## 2021-11-10 ENCOUNTER — APPOINTMENT (OUTPATIENT)
Dept: OBGYN | Facility: CLINIC | Age: 31
End: 2021-11-10
Payer: MEDICAID

## 2021-11-10 ENCOUNTER — OUTPATIENT (OUTPATIENT)
Dept: OUTPATIENT SERVICES | Facility: HOSPITAL | Age: 31
LOS: 1 days | Discharge: HOME | End: 2021-11-10

## 2021-11-10 VITALS
DIASTOLIC BLOOD PRESSURE: 80 MMHG | HEIGHT: 68 IN | WEIGHT: 173.06 LBS | SYSTOLIC BLOOD PRESSURE: 120 MMHG | BODY MASS INDEX: 26.23 KG/M2

## 2021-11-10 DIAGNOSIS — Z30.432 ENCOUNTER FOR REMOVAL OF INTRAUTERINE CONTRACEPTIVE DEVICE: ICD-10-CM

## 2021-11-10 DIAGNOSIS — F17.200 NICOTINE DEPENDENCE, UNSPECIFIED, UNCOMPLICATED: ICD-10-CM

## 2021-11-10 DIAGNOSIS — N39.0 URINARY TRACT INFECTION, SITE NOT SPECIFIED: ICD-10-CM

## 2021-11-10 DIAGNOSIS — Z98.890 OTHER SPECIFIED POSTPROCEDURAL STATES: Chronic | ICD-10-CM

## 2021-11-10 PROCEDURE — 99212 OFFICE O/P EST SF 10 MIN: CPT | Mod: 25

## 2021-11-10 PROCEDURE — 58301 REMOVE INTRAUTERINE DEVICE: CPT

## 2021-11-10 RX ORDER — BUPROPION HYDROCHLORIDE 150 MG/1
150 TABLET, EXTENDED RELEASE ORAL
Qty: 58 | Refills: 3 | Status: ACTIVE | COMMUNITY
Start: 2021-11-10 | End: 1900-01-01

## 2021-11-10 NOTE — PROCEDURE
[IUD Removal] : intrauterine device (IUD) removal [Dysmenorrhea] : dysmenorrhea [Risks] : risks [Benefits] : benefits [Alternatives] : alternatives [Patient] : patient [IUD Removed - Forceps] : IUD removed - forceps [IUD Discarded] : IUD discarded [Tolerated Well] : Patient tolerated the procedure well [No Complications] : no complications

## 2021-11-10 NOTE — HISTORY OF PRESENT ILLNESS
[FreeTextEntry1] : 31 y/o female 2 issues\par \par 1 smoking cessation-has stopped but wants to quit cravings\par \par 2-pain\par uti treated\par sono nl with hemorrhagic cyst\par will remove iud\par upreg done\par \par pt wants chantix ( FDA recalled) will give zyban

## 2021-12-07 NOTE — ED PROVIDER NOTE - NS ED ATTENDING STATEMENT MOD
Continuity of Care Document (CCD)

                             Created on: 2021



Harjit Chávez

External Reference #: [id number]

: 2007

Sex: Male



Demographics





                          Address                   [address] [place] [address]  [id number]

 

                          Home Phone                [phone]

 

                          Preferred Language        Unknown

 

                          Marital Status            Unknown

 

                          Jain Affiliation     Unknown

 

                          Race                      White

 

                          Ethnic Group              Not  or 





Author





                          Author                    SAI CORTEZ, Harjit MORATAYA

 

                          Organization              Unknown

 

                          Address                   [address] [place] [address]  [address]



 

                          Phone                     [phone]







Care Team Providers





                    Care Team Member Name Role                Phone

 

                    Lauren Hummel AUTM                [phone]

 

                          AUTM                      Unavailable







Problems





                    Active Problems     Provider            Date

 

                    Allergic rhinitis   Maximo Barcenas MD  Onset: 2018

 

                    Hypertrophy of tonsils Maximo Barcenas MD  Onset: 2018







Social History





                Type            Date            Description     Comments

 

                Birth Sex                       Unknown          

 

                Tobacco Use     Start: Unknown  No Exposure To Second-Hand Smoke

 In The Home  







Allergies, Adverse Reactions, Alerts





             Active Allergies Criticality  Reaction | Severity Comments     Date

 

             Seasonal     Unable to assess criticality                          

 2018







Medications





           Active Medications SIG        Qnty       Indications Ordering Provide

r Date

 

                          Claritin                     10mg Capsules            

       10 mg by mouth once

per day         30caps          J30.9           Maximo Barcenas MD 2018

 

                                        Mucinex Fast-Max Severe Cold & Sinus    

                 10-5-325mg Capsules    

              prn                                    Unknown      







Immunizations





                                        Description

 

                                        No Information Available







Vital Signs





                Date            Vital           Result          Comment

 

                2021 11:24am Height          69.25 inches    5'9.25"

 

                    Weight              107.00 lb            

 

                    BMI (Body Mass Index) 15.7 kg/m2           

 

                    Weight              48.535 kg            

 

                    Weight Percentile   11th                 

 

                    Height Percentile   68 %                 

 

                    BSA (Body Surface Area) 1.59 m2              

 

                2018  1:05pm Height          59.5 inches     4'11.50"

 

                    Weight              89.00 lb             

 

                    BMI (Body Mass Index) 17.7 kg/m2           

 

                    Weight              40.370 kg            

 

                    Weight Percentile   42nd                 

 

                    Height Percentile   50 %                 

 

                    BSA (Body Surface Area) 1.31 m2              







Results





                                        Description

 

                                        No Information Available







Procedures





                Date            Code            Description     Status

 

                2021      [address]           Office/Outpatient New Moderate M

DM 45-59 Minutes Completed







Medical Devices





                                        Description

 

                                        No Information Available







Encounters





           Type       Date       Location   Provider   Dx         Diagnosis

 

           Office Visit 2021 11:00a [facility] Luis E Rick MD

 J35.1      

Hypertrophy of tonsils

 

                          J35.01                    Chronic tonsillitis







Assessments





                Date            Code            Description     Provider

 

                2021      J35.1           Hypertrophy of tonsils Luis E albright MD

 

                2021      J35.01          Chronic tonsillitis Luis E Rick MD







Plan of Treatment





No Information Available



Functional Status





                                        Description

 

                                        No Information Available







Mental Status





                                        Description

 

                                        No Information Available







Referrals





                Refer to Dr     Reason for Referral Status          Appt Luis E Alicia MD eval. and management of chr

onic tonsillitis w/ 

recurrent tonsil stones. J35.01 - Chronic Tonsillitis.   Scheduled              

   2021

 

                                        [address] [address]

 

                                        [phone] I have personally seen and examined this patient.  I have fully participated in the care of this patient. I have reviewed all pertinent clinical information, including history, physical exam, plan and the Resident’s note and agree except as noted.

## 2022-01-06 NOTE — ED ADULT NURSE NOTE - CAS DISCH CONDITION
[facility] - [address] [place] [address] Medicine  Progress Note    Patient Name: Luisito Stuart  MRN: [id number]  Patient Class: OP- Observation   Admission Date: 1/3/2022  Length of Stay: 0 days  Attending Physician: Stephen Malcolm MD  Primary Care Provider: JOYA Hill        Subjective:     Principal Problem:Acute on chronic congestive heart failure        HPI:  Luisito Stuart is a 73 y.o.m. with a PMHx of Afib, CHF, COPD, CAD HTN, and IBS that presents to the ED today due to lower leg edema and leg pain bilaterally. Prince reports his legs have been swollen and pain for about 4 days. He reports he came to the ED 3 days ago for these symptoms and was told to double the dose of his Lasix 20mg. Patient had an appointment with Dr. Mckee today. During the visit patient was instructed to go to the ED for IV diuretics. Patient was admitted to inpatient  [facility] care. Patient states he is taking all his home medication however, when his daughter was contacted, she stated he is only taking Entresto. Will consult cardiology for help with medications.            Overview/Hospital Course:  No notes on file    Interval History:     This morning, pt is lying in bed and resting. Denies any chest pain or SOB. Leg edema improved from yesterday. Denies any dizziness or nausea. Pt had R/LHC yesterday and shows 70% stenosis in Ost Cx lesion, 60% stenosis in Dist LM to Ost LAD lesion, 100% stenosis in Ost RCA to Prox RCA lesion, Mid LAD lesion stenosis 70%, 50% stenosis in dist Graft to Insertion lesion. Given complexity of the lesion and pts CKD with low outpt, further attempts at revascularization deferred per cardiology. Will consider PCI to ostial circumflex once pt is euvolemic. Per cardiology, lasix increased to 80mg IV BID, increased dobutamine at 5mcg/kg/min.     This morning, pt was noted to have hypotension at 80/30 with continued dobutamine. Consulted ICU. Per pulmonology, pt  was not symptomatic and given his low EF and diminished cardiac outpt, generating a normal BP not expected. Would consider transfer if pt shows signs of malperfusion or symptoms of hypotension.       Review of Systems   Constitutional: Negative for activity change, appetite change, chills, diaphoresis and fever.   HENT: Negative for congestion, hearing loss, nosebleeds, postnasal drip, rhinorrhea, sore throat and trouble swallowing.    Eyes: Negative for visual disturbance.   Respiratory: Negative for cough, chest tightness and shortness of breath.    Cardiovascular: Positive for leg swelling. Negative for chest pain.   Gastrointestinal: Negative for abdominal pain, blood in stool, constipation, diarrhea, nausea and vomiting.   Genitourinary: Negative for dysuria and hematuria.   Musculoskeletal: Negative for joint swelling.   Skin: Negative for rash.   Neurological: Negative for dizziness, weakness, light-headedness and headaches.   All other systems reviewed and are negative.    Objective:     Vital Signs (Most Recent):  Temp: 98.1 °F (36.7 °C) (01/06/22 1100)  Pulse: 81 (01/06/22 1100)  Resp: 18 (01/06/22 1100)  BP: (!) 98/54 (Nurse Sri was informed of the blood pressure.) (01/06/22 1100)  SpO2: 98 % (01/06/22 1100) Vital Signs (24h Range):  Temp:  [97.5 °F (36.4 °C)-98.4 °F (36.9 °C)] 98.1 °F (36.7 °C)  Pulse:  [] 81  Resp:  [18-20] 18  SpO2:  [94 %-98 %] 98 %  BP: ()/(40-89) 98/54     Weight: 82.9 kg (182 lb 12.2 oz)  Body mass index is 26.99 kg/m².    Intake/Output Summary (Last 24 hours) at 1/6/2022 1546  Last data filed at 1/6/2022 1500  Gross per 24 hour   Intake 240 ml   Output 1525 ml   Net -1285 ml      Physical Exam  Vitals and nursing note reviewed.   Constitutional:       Appearance: Normal appearance. He is not ill-appearing, toxic-appearing or diaphoretic.   HENT:      Head: Normocephalic and atraumatic.      Nose: Nose normal. No congestion or rhinorrhea.      Mouth/Throat:       Mouth: Mucous membranes are moist.      Pharynx: Oropharynx is clear. No oropharyngeal exudate or posterior oropharyngeal erythema.   Eyes:      Conjunctiva/sclera: Conjunctivae normal.      Pupils: Pupils are equal, round, and reactive to light.   Cardiovascular:      Rate and Rhythm: Normal rate and regular rhythm.      Pulses: Normal pulses.      Heart sounds: Normal heart sounds. No murmur heard.  No friction rub.   Pulmonary:      Effort: Pulmonary effort is normal. No respiratory distress.      Breath sounds: Normal breath sounds. No wheezing, rhonchi or rales.   Abdominal:      Tenderness: There is no abdominal tenderness. There is no guarding.   Musculoskeletal:      Right lower leg: Edema present.      Left lower leg: Edema present.   Skin:     Capillary Refill: Capillary refill takes less than 2 seconds.   Neurological:      General: No focal deficit present.      Mental Status: He is alert and oriented to person, place, and time.   Psychiatric:         Mood and Affect: Mood normal.         Behavior: Behavior normal.         Thought Content: Thought content normal.         Judgment: Judgment normal.         Significant Labs:   Recent Lab Results       01/06/22  1102   01/06/22  0554   01/06/22  0549   01/05/22 2027        Albumin/Globulin Ratio     1.0         Albumin     3.1         Alkaline Phosphatase     76         ALT     11         Anion Gap     11         Aniso   1+           AST     16         Bands   1           Baso #   0.07           Basophil %   0.9              1           BILIRUBIN TOTAL     1.5         BUN     40         BUN/CREAT RATIO     16         Calcium     8.4         Chloride     97         CO2     32         Creatinine     2.50         Differential Type   Manual           eGFR if non      27         Eos #   0.09           Eosinophil %   1.2           Globulin, Total     3.0         Glucose     93         Hematocrit   36.2           Hemoglobin   11.4            Immature Grans (Abs)   0.04           Immature Granulocytes   0.5           Lymph #   0.59           Lymph %   7.6              7           Magnesium     2.0         MCH   27.1           MCHC   31.5           MCV   86.2           Mono #   0.92           Mono %   11.9              8           MPV   11.9           Neutrophils, Abs   6.01           Neutrophils Relative   77.9           nRBC   0.0           NUCLEATED RBC ABSOLUTE   0.00           Ovalocytes   Few           PLATELET MORPHOLOGY   Few Large Platelets           Platelets   153           POC Glucose 88       140       Potassium     4.1         PROTEIN TOTAL     6.1         RBC   4.20           RDW   17.9           Segmented Neutrophils, Man %   83           Sodium     136         WBC   7.72                 Significant Imaging: I have reviewed all pertinent imaging results/findings within the past 24 hours.      Assessment/Plan:      * Acute on chronic congestive heart failure  Patient has a PMHx of CHF with EF of 20% and diastolic and systolic dysfunction  - Cardiology consult due to CHF exacerbation,  patient not taking any home meds  - SS consulted for home health for medication management, patient denied HH  - BNP 89977  -LE edema improving  - Lasix 60 mg IV x1 in ED with good urinary output response   - Lasix increased to 80 BID IV (1/5/21)  -IV dobutamine  - strict I/Os  - daily weights  - monitoring potassium  S/P R/LHC(1/5): 70% stenosis in Ost Cx lesion, 60% stenosis in Dist LM to Ost LAD lesion, 100% stenosis in Ost RCA to Prox RCA lesion, Mid LAD lesion stenosis 70%, 50% stenosis in dist Graft to Insertion lesion. Given complexity of the lesion and pts CKD with low outpt, further attempts at revascularization deferred per cardiology. Will consider PCI to ostial circumflex once pt is euvolemic.      Hypotension due to drugs  Consulted ICU for hypotension at 80/30  Per pulmonology:  -denies symptoms  - continues to appear volume overloaded  - would  continue current management, continue to monitor  - given his low EF and diminished cardiac output he will have difficulty generating a normal BP  - would consider transfer if patient shows signs of malperfusion or symptoms of hypotension          A-fib  PMHx of Afib  - Amiodarone 200mg PO QD  - Eliquis 5mg PO BID held due to L/RHC (1/5/21)  - on telemetry           Hyperlipidemia  Atorvastatin 40mg PO QD      Hypertension  - patient not taking any home meds, cardiology consulted for medication advice   - Hydralazine w/ parameters  -Per cardiology, Plan to wean the dobutamine today and if no issues overnight should be fine to dc tomorrow and follow up with cardiology in one week.\  -After dobutamine is off would restart beta blocker as taken prior to admission.  -His renal function has continued to decline and would hold ACE/ARB/Entresto until his renal function has stablized.  -Also would not start SGLT2 inhibitor at this time due to issues with hypotension and poor renal function. These issues may be reassessed at follow up in cardiology clinic with Dr. Wesley.       Lower extremity edema  - Lasix 60 mg IV x1 in ED  -Increased lasix to 80 IV BID  - strict I/Os  - daily weights  - monitoring potassium  - Norco 5 x1 given for leg pain on 1/4          VTE Risk Mitigation (From admission, onward)         Ordered     Reason for No Pharmacological VTE Prophylaxis  Once        Question:  Reasons:  Answer:  Already adequately anticoagulated on oral Anticoagulants    01/03/22 1638     IP VTE HIGH RISK PATIENT  Once         01/03/22 1638     Place sequential compression device  Until discontinued         01/03/22 1638                Discharge Planning   MORGAN:      Code Status: Partial Code   Is the patient medically ready for discharge?:     Reason for patient still in hospital (select all that apply): Patient trending condition                     Jenn Ashraf DO  Department of Hospital Medicine   [facility] - [address]  [facility]   Stable

## 2022-09-13 ENCOUNTER — NON-APPOINTMENT (OUTPATIENT)
Age: 32
End: 2022-09-13

## 2022-09-13 RX ORDER — NORETHINDRONE ACETATE AND ETHINYL ESTRADIOL AND FERROUS FUMARATE 1MG-20(21)
1-20 KIT ORAL DAILY
Qty: 28 | Refills: 12 | Status: ACTIVE | COMMUNITY
Start: 2021-11-10 | End: 1900-01-01

## 2022-10-12 ENCOUNTER — APPOINTMENT (OUTPATIENT)
Dept: OBGYN | Facility: CLINIC | Age: 32
End: 2022-10-12

## 2023-02-13 ENCOUNTER — NON-APPOINTMENT (OUTPATIENT)
Age: 33
End: 2023-02-13

## 2023-09-19 ENCOUNTER — RX RENEWAL (OUTPATIENT)
Age: 33
End: 2023-09-19

## 2023-09-19 RX ORDER — NORETHINDRONE ACETATE AND ETHINYL ESTRADIOL AND FERROUS FUMARATE 1MG-20(21)
1-20 KIT ORAL
Qty: 28 | Refills: 12 | Status: ACTIVE | COMMUNITY
Start: 2023-09-19 | End: 1900-01-01

## 2023-11-06 NOTE — ED ADULT NURSE NOTE - CINV DISCH TEACH PARTICIP
You can access the FollowMyHealth Patient Portal offered by [facility] by registering at the following website: [email or web address]. By joining [facility]’s FollowMyHealth portal, you will also be able to view your health information using other applications (apps) compatible with our system. Patient

## 2024-04-02 ENCOUNTER — EMERGENCY (EMERGENCY)
Facility: HOSPITAL | Age: 34
LOS: 0 days | Discharge: HOME | End: 2024-04-02
Attending: STUDENT IN AN ORGANIZED HEALTH CARE EDUCATION/TRAINING PROGRAM
Payer: COMMERCIAL

## 2024-04-02 VITALS
HEIGHT: 68 IN | SYSTOLIC BLOOD PRESSURE: 113 MMHG | TEMPERATURE: 98 F | OXYGEN SATURATION: 99 % | DIASTOLIC BLOOD PRESSURE: 91 MMHG | HEART RATE: 71 BPM | WEIGHT: 154.98 LBS

## 2024-04-02 VITALS
SYSTOLIC BLOOD PRESSURE: 101 MMHG | TEMPERATURE: 98 F | OXYGEN SATURATION: 99 % | DIASTOLIC BLOOD PRESSURE: 64 MMHG | HEART RATE: 70 BPM | RESPIRATION RATE: 18 BRPM

## 2024-04-02 DIAGNOSIS — R50.9 FEVER, UNSPECIFIED: ICD-10-CM

## 2024-04-02 DIAGNOSIS — H92.02 OTALGIA, LEFT EAR: ICD-10-CM

## 2024-04-02 DIAGNOSIS — Z98.890 OTHER SPECIFIED POSTPROCEDURAL STATES: Chronic | ICD-10-CM

## 2024-04-02 DIAGNOSIS — M79.10 MYALGIA, UNSPECIFIED SITE: ICD-10-CM

## 2024-04-02 DIAGNOSIS — J02.0 STREPTOCOCCAL PHARYNGITIS: ICD-10-CM

## 2024-04-02 DIAGNOSIS — J02.9 ACUTE PHARYNGITIS, UNSPECIFIED: ICD-10-CM

## 2024-04-02 DIAGNOSIS — R10.9 UNSPECIFIED ABDOMINAL PAIN: ICD-10-CM

## 2024-04-02 LAB
ANION GAP SERPL CALC-SCNC: 12 MMOL/L — SIGNIFICANT CHANGE UP (ref 7–14)
APPEARANCE UR: CLEAR — SIGNIFICANT CHANGE UP
BACTERIA # UR AUTO: NEGATIVE /HPF — SIGNIFICANT CHANGE UP
BASOPHILS # BLD AUTO: 0.05 K/UL — SIGNIFICANT CHANGE UP (ref 0–0.2)
BASOPHILS NFR BLD AUTO: 0.3 % — SIGNIFICANT CHANGE UP (ref 0–1)
BILIRUB UR-MCNC: NEGATIVE — SIGNIFICANT CHANGE UP
BUN SERPL-MCNC: 5 MG/DL — LOW (ref 10–20)
CALCIUM SERPL-MCNC: 9 MG/DL — SIGNIFICANT CHANGE UP (ref 8.4–10.4)
CAST: 0 /LPF — SIGNIFICANT CHANGE UP (ref 0–4)
CHLORIDE SERPL-SCNC: 104 MMOL/L — SIGNIFICANT CHANGE UP (ref 98–110)
CO2 SERPL-SCNC: 21 MMOL/L — SIGNIFICANT CHANGE UP (ref 17–32)
COLOR SPEC: YELLOW — SIGNIFICANT CHANGE UP
CREAT SERPL-MCNC: 0.7 MG/DL — SIGNIFICANT CHANGE UP (ref 0.7–1.5)
DIFF PNL FLD: ABNORMAL
EGFR: 116 ML/MIN/1.73M2 — SIGNIFICANT CHANGE UP
EOSINOPHIL # BLD AUTO: 0.04 K/UL — SIGNIFICANT CHANGE UP (ref 0–0.7)
EOSINOPHIL NFR BLD AUTO: 0.3 % — SIGNIFICANT CHANGE UP (ref 0–8)
GLUCOSE SERPL-MCNC: 112 MG/DL — HIGH (ref 70–99)
GLUCOSE UR QL: NEGATIVE MG/DL — SIGNIFICANT CHANGE UP
HCG SERPL QL: NEGATIVE — SIGNIFICANT CHANGE UP
HCT VFR BLD CALC: 38 % — SIGNIFICANT CHANGE UP (ref 37–47)
HGB BLD-MCNC: 12.9 G/DL — SIGNIFICANT CHANGE UP (ref 12–16)
IMM GRANULOCYTES NFR BLD AUTO: 0.5 % — HIGH (ref 0.1–0.3)
KETONES UR-MCNC: NEGATIVE MG/DL — SIGNIFICANT CHANGE UP
LACTATE SERPL-SCNC: 0.9 MMOL/L — SIGNIFICANT CHANGE UP (ref 0.7–2)
LEUKOCYTE ESTERASE UR-ACNC: NEGATIVE — SIGNIFICANT CHANGE UP
LYMPHOCYTES # BLD AUTO: 0.95 K/UL — LOW (ref 1.2–3.4)
LYMPHOCYTES # BLD AUTO: 6.3 % — LOW (ref 20.5–51.1)
MCHC RBC-ENTMCNC: 31.3 PG — HIGH (ref 27–31)
MCHC RBC-ENTMCNC: 33.9 G/DL — SIGNIFICANT CHANGE UP (ref 32–37)
MCV RBC AUTO: 92.2 FL — SIGNIFICANT CHANGE UP (ref 81–99)
MONOCYTES # BLD AUTO: 1.02 K/UL — HIGH (ref 0.1–0.6)
MONOCYTES NFR BLD AUTO: 6.7 % — SIGNIFICANT CHANGE UP (ref 1.7–9.3)
NEUTROPHILS # BLD AUTO: 13 K/UL — HIGH (ref 1.4–6.5)
NEUTROPHILS NFR BLD AUTO: 85.9 % — HIGH (ref 42.2–75.2)
NITRITE UR-MCNC: NEGATIVE — SIGNIFICANT CHANGE UP
NRBC # BLD: 0 /100 WBCS — SIGNIFICANT CHANGE UP (ref 0–0)
PH UR: 6.5 — SIGNIFICANT CHANGE UP (ref 5–8)
PLATELET # BLD AUTO: 214 K/UL — SIGNIFICANT CHANGE UP (ref 130–400)
PMV BLD: 12 FL — HIGH (ref 7.4–10.4)
POTASSIUM SERPL-MCNC: 4.5 MMOL/L — SIGNIFICANT CHANGE UP (ref 3.5–5)
POTASSIUM SERPL-SCNC: 4.5 MMOL/L — SIGNIFICANT CHANGE UP (ref 3.5–5)
PROT UR-MCNC: NEGATIVE MG/DL — SIGNIFICANT CHANGE UP
RBC # BLD: 4.12 M/UL — LOW (ref 4.2–5.4)
RBC # FLD: 13 % — SIGNIFICANT CHANGE UP (ref 11.5–14.5)
RBC CASTS # UR COMP ASSIST: 0 /HPF — SIGNIFICANT CHANGE UP (ref 0–4)
SODIUM SERPL-SCNC: 137 MMOL/L — SIGNIFICANT CHANGE UP (ref 135–146)
SP GR SPEC: 1.01 — SIGNIFICANT CHANGE UP (ref 1–1.03)
SQUAMOUS # UR AUTO: 2 /HPF — SIGNIFICANT CHANGE UP (ref 0–5)
UROBILINOGEN FLD QL: 1 MG/DL — SIGNIFICANT CHANGE UP (ref 0.2–1)
WBC # BLD: 15.13 K/UL — HIGH (ref 4.8–10.8)
WBC # FLD AUTO: 15.13 K/UL — HIGH (ref 4.8–10.8)
WBC UR QL: 1 /HPF — SIGNIFICANT CHANGE UP (ref 0–5)

## 2024-04-02 PROCEDURE — 83690 ASSAY OF LIPASE: CPT

## 2024-04-02 PROCEDURE — 87040 BLOOD CULTURE FOR BACTERIA: CPT

## 2024-04-02 PROCEDURE — 83605 ASSAY OF LACTIC ACID: CPT

## 2024-04-02 PROCEDURE — 99285 EMERGENCY DEPT VISIT HI MDM: CPT

## 2024-04-02 PROCEDURE — 81001 URINALYSIS AUTO W/SCOPE: CPT

## 2024-04-02 PROCEDURE — 96374 THER/PROPH/DIAG INJ IV PUSH: CPT

## 2024-04-02 PROCEDURE — 84703 CHORIONIC GONADOTROPIN ASSAY: CPT

## 2024-04-02 PROCEDURE — 36415 COLL VENOUS BLD VENIPUNCTURE: CPT

## 2024-04-02 PROCEDURE — 80048 BASIC METABOLIC PNL TOTAL CA: CPT

## 2024-04-02 PROCEDURE — 71046 X-RAY EXAM CHEST 2 VIEWS: CPT

## 2024-04-02 PROCEDURE — 71046 X-RAY EXAM CHEST 2 VIEWS: CPT | Mod: 26

## 2024-04-02 PROCEDURE — 87086 URINE CULTURE/COLONY COUNT: CPT

## 2024-04-02 PROCEDURE — 99285 EMERGENCY DEPT VISIT HI MDM: CPT | Mod: 25

## 2024-04-02 PROCEDURE — 85025 COMPLETE CBC W/AUTO DIFF WBC: CPT

## 2024-04-02 PROCEDURE — 96375 TX/PRO/DX INJ NEW DRUG ADDON: CPT

## 2024-04-02 PROCEDURE — 93010 ELECTROCARDIOGRAM REPORT: CPT

## 2024-04-02 PROCEDURE — 93005 ELECTROCARDIOGRAM TRACING: CPT

## 2024-04-02 PROCEDURE — 80076 HEPATIC FUNCTION PANEL: CPT

## 2024-04-02 RX ORDER — METOCLOPRAMIDE HCL 10 MG
10 TABLET ORAL ONCE
Refills: 0 | Status: COMPLETED | OUTPATIENT
Start: 2024-04-02 | End: 2024-04-02

## 2024-04-02 RX ORDER — SODIUM CHLORIDE 9 MG/ML
1000 INJECTION INTRAMUSCULAR; INTRAVENOUS; SUBCUTANEOUS ONCE
Refills: 0 | Status: COMPLETED | OUTPATIENT
Start: 2024-04-02 | End: 2024-04-02

## 2024-04-02 RX ORDER — DEXAMETHASONE 0.5 MG/5ML
10 ELIXIR ORAL ONCE
Refills: 0 | Status: COMPLETED | OUTPATIENT
Start: 2024-04-02 | End: 2024-04-02

## 2024-04-02 RX ORDER — KETOROLAC TROMETHAMINE 30 MG/ML
15 SYRINGE (ML) INJECTION ONCE
Refills: 0 | Status: DISCONTINUED | OUTPATIENT
Start: 2024-04-02 | End: 2024-04-02

## 2024-04-02 RX ADMIN — SODIUM CHLORIDE 2000 MILLILITER(S): 9 INJECTION INTRAMUSCULAR; INTRAVENOUS; SUBCUTANEOUS at 06:47

## 2024-04-02 RX ADMIN — Medication 15 MILLIGRAM(S): at 06:47

## 2024-04-02 RX ADMIN — Medication 10 MILLIGRAM(S): at 06:47

## 2024-04-02 RX ADMIN — Medication 104 MILLIGRAM(S): at 06:47

## 2024-04-02 NOTE — ED PROVIDER NOTE - CLINICAL SUMMARY MEDICAL DECISION MAKING FREE TEXT BOX
Throughout ED observation period, pt remained clinically and hemodynamically stable.  labs significant for leukocytosis- expected given strep infection and pt on oral abx, no e/o intra-abd infection, ua negative, cxr clear. pt only took 1-2 doses abx so would not call failure of abx.    will dc w/ return precautions

## 2024-04-02 NOTE — ED ADULT TRIAGE NOTE - CHIEF COMPLAINT QUOTE
Pt c/o fever of 106F at home. Tylenol taken at 0400. went to  (+)strep Pt c/o fever of "106F" at home. Tylenol taken at 0400. Went to  yesterday (+)strep, abx prescribed

## 2024-04-02 NOTE — ED PROVIDER NOTE - ATTENDING CONTRIBUTION TO CARE
Patient presents to ED for evaluation of fever/sore throat. Patient had been to urgent care center and was given abx. Patient denies recent travel, and denies abd pain/n/v. HA/neck pain.   Vitals reviewed.   Patient is awake, alert, answering questions appropriately, appears comfortable and not in any distress.  Lungs: CTA, no wheezing, no crackles.  Abd: +BS, NT, ND, soft  CNS: awake, alert, o x 3, no focal neurologic deficits.  No meningeal signs noted,   A/P: Fever/sore throat,   Labs, CXR, reevaluation. Patient presents to ED for evaluation of fever/sore throat. Patient had been to urgent care center and was given abx. Patient denies recent travel, and denies abd pain/n/v. HA/neck pain.   Vitals reviewed.   Patient is awake, alert, answering questions appropriately, appears comfortable and not in any distress.  Lungs: CTA, no wheezing, no crackles.  Abd: +BS, NT, ND, soft  CNS: awake, alert, o x 3, no focal neurologic deficits.  No meningeal signs noted,   A/P: Fever/sore throat,   Labs, CXR, reevaluation.  Patient care signed out to Dr. Beltran at shift change.

## 2024-04-02 NOTE — ED PROVIDER NOTE - PROGRESS NOTE DETAILS
BECKIE-- signed out to Dr. Burris pending labs, reeval KENDRA: Signout received from overnight team Dr. Madrigal pending lab results, urine, CXR and EKG.  All above are within normal limits, patient feels much better after medications. Well appearing, +tonsillar exudates on exam but normal voice, normal uvula, no signs of peritonsillar abscess. States she is having some vaginal spotting which is not abnormal for her as her periods come once every 3 months.  Follows regularly with OB and is currently refusing pelvic exam at this time and states she will follow-up with her OB for this.  Will discharge home with outpatient follow-up, patient already has antibiotics and steroids at home that were sent by urgent care.  Supportive care and return precautions advised.  Patient and significant other are comfortable with plan.    Patient to be discharged from ED. Any available test results were discussed with patient and/or family. Verbal instructions given, including instructions to return to ED immediately for any new, worsening, or concerning symptoms. Patient endorsed understanding. Written discharge instructions additionally given, including follow-up plan. CO- pt reassessed after signout, pt feeling much better, lungs clear, no rash, no cvat, abd sntnd, no difficulty breathing/drooling/stridor

## 2024-04-02 NOTE — ED PROVIDER NOTE - CARE PROVIDER_API CALL
your primary care doctor and OBGYN,   Phone: (   )    -  Fax: (   )    -  Established Patient  Follow Up Time: 1-3 Days

## 2024-04-02 NOTE — ED PROVIDER NOTE - EKG/XRAY ADDITIONAL INFORMATION
EKG reviewed by me Dr. Veras and shows, sinus rhythm, CO/QRS/QTC intervals are in acceptable range, no significant ST/T wave changes noted.  Chest X-rays reviewed and interpreted by me Dr. Veras and shows no actue findings. No Pneumothorax, no free air, no effusions, and these findings discussed with patient.

## 2024-04-02 NOTE — ED PROVIDER NOTE - PATIENT PORTAL LINK FT
You can access the FollowMyHealth Patient Portal offered by [facility] by registering at the following website: [email or web address]. By joining [facility]’s FollowMyHealth portal, you will also be able to view your health information using other applications (apps) compatible with our system.

## 2024-04-02 NOTE — ED PROVIDER NOTE - OBJECTIVE STATEMENT
33yoF no PMHx presenting with fever, sore throat, left ear pain, myalgias, chills for the past 2 days. Pt was seen at urgent care yesterday, diagnosed with strep throat, prescribed Augmentin, Tylenol, methylprednisolone 4mg, took one dose of all three at 4am prior to arrival. Family was recently sick with similar 33yoF no PMHx presenting with fever, sore throat, left ear pain, myalgias, chills for the past 2 days. Pt was seen at urgent care yesterday, diagnosed with strep throat, prescribed Augmentin, Tylenol, methylprednisolone 4mg, took one dose of all three at 4am prior to arrival. Family was recently sick with similar symptoms that resolved. Also complains of intermittent abdominal cramping. Received all childhood vaccines. Denies vomiting, diarrhea, rashes, cough

## 2024-04-02 NOTE — ED PROVIDER NOTE - DIFFERENTIAL DIAGNOSIS
Differential Diagnosis Electrolyte abnormalities, dehydration, FAUSTO, hyperglycemia, hypoglycemia, symptomatic anemia.  r/o pneumonia, r/o cardiac arrhythmia.

## 2024-04-02 NOTE — ED PROVIDER NOTE - PROVIDER TOKENS
FREE:[LAST:[your primary care doctor and OBGYN],PHONE:[(   )    -],FAX:[(   )    -],FOLLOWUP:[1-3 Days],ESTABLISHEDPATIENT:[T]]

## 2024-04-02 NOTE — ED PROVIDER NOTE - PHYSICAL EXAMINATION
CONSTITUTIONAL: NAD  SKIN: Warm, dry  HEAD: NCAT  EYES: Clear conjunctiva   ENMT: B/L TM pearly with good light reflex; Oral mucosa and posterior oropharynx moist, +tonsilar erythema, exudates and swelling, Uvula midline.  NECK:  supple, no masses, mild bilateral anterior lymphadenopathy  CARD: RRR, S1, S2; no M/R/G  RESP: Normal respiratory effort, CTAB  ABD: Soft, nontender. No rebound, rigidity, or guarding  EXT: Pulses palpable distally  NEURO: Grossly intact. Awake, alert, moving all extremities, no facial asymmetry.

## 2024-04-03 LAB
CULTURE RESULTS: NO GROWTH — SIGNIFICANT CHANGE UP
SPECIMEN SOURCE: SIGNIFICANT CHANGE UP

## 2024-04-07 LAB
CULTURE RESULTS: SIGNIFICANT CHANGE UP
SPECIMEN SOURCE: SIGNIFICANT CHANGE UP

## 2024-09-16 ENCOUNTER — OUTPATIENT (OUTPATIENT)
Dept: OUTPATIENT SERVICES | Facility: HOSPITAL | Age: 34
LOS: 1 days | End: 2024-09-16
Payer: COMMERCIAL

## 2024-09-16 ENCOUNTER — APPOINTMENT (OUTPATIENT)
Dept: OBGYN | Facility: CLINIC | Age: 34
End: 2024-09-16
Payer: COMMERCIAL

## 2024-09-16 VITALS
DIASTOLIC BLOOD PRESSURE: 72 MMHG | BODY MASS INDEX: 26.83 KG/M2 | HEIGHT: 68 IN | WEIGHT: 177 LBS | SYSTOLIC BLOOD PRESSURE: 110 MMHG

## 2024-09-16 DIAGNOSIS — Z32.00 ENCOUNTER FOR PREGNANCY TEST, RESULT UNKNOWN: ICD-10-CM

## 2024-09-16 DIAGNOSIS — Z34.90 ENCOUNTER FOR SUPERVISION OF NORMAL PREGNANCY, UNSPECIFIED, UNSPECIFIED TRIMESTER: ICD-10-CM

## 2024-09-16 DIAGNOSIS — Z98.890 OTHER SPECIFIED POSTPROCEDURAL STATES: Chronic | ICD-10-CM

## 2024-09-16 DIAGNOSIS — Z00.00 ENCOUNTER FOR GENERAL ADULT MEDICAL EXAMINATION W/OUT ABNORMAL FINDINGS: ICD-10-CM

## 2024-09-16 PROCEDURE — 83020 HEMOGLOBIN ELECTROPHORESIS: CPT | Mod: 26

## 2024-09-16 PROCEDURE — 86803 HEPATITIS C AB TEST: CPT

## 2024-09-16 PROCEDURE — 83655 ASSAY OF LEAD: CPT

## 2024-09-16 PROCEDURE — 86765 RUBEOLA ANTIBODY: CPT

## 2024-09-16 PROCEDURE — 86762 RUBELLA ANTIBODY: CPT

## 2024-09-16 PROCEDURE — G0452: CPT | Mod: 26

## 2024-09-16 PROCEDURE — 76815 OB US LIMITED FETUS(S): CPT | Mod: 26

## 2024-09-16 PROCEDURE — 81443 GENETIC TSTG SEVERE INH COND: CPT

## 2024-09-16 PROCEDURE — 86787 VARICELLA-ZOSTER ANTIBODY: CPT

## 2024-09-16 PROCEDURE — 86850 RBC ANTIBODY SCREEN: CPT

## 2024-09-16 PROCEDURE — 83036 HEMOGLOBIN GLYCOSYLATED A1C: CPT

## 2024-09-16 PROCEDURE — 99213 OFFICE O/P EST LOW 20 MIN: CPT | Mod: 25

## 2024-09-16 PROCEDURE — 81422 FETAL CHRMOML MICRODELTJ: CPT

## 2024-09-16 PROCEDURE — 86780 TREPONEMA PALLIDUM: CPT

## 2024-09-16 PROCEDURE — 81243 FMR1 GEN ALY DETC ABNL ALLEL: CPT

## 2024-09-16 PROCEDURE — 81420 FETAL CHRMOML ANEUPLOIDY: CPT

## 2024-09-16 PROCEDURE — 99213 OFFICE O/P EST LOW 20 MIN: CPT

## 2024-09-16 PROCEDURE — 81222 CFTR GENE DUP/DELET VARIANTS: CPT

## 2024-09-16 PROCEDURE — 81220 CFTR GENE COM VARIANTS: CPT

## 2024-09-16 PROCEDURE — 83020 HEMOGLOBIN ELECTROPHORESIS: CPT

## 2024-09-16 PROCEDURE — 99395 PREV VISIT EST AGE 18-39: CPT

## 2024-09-16 PROCEDURE — 85027 COMPLETE CBC AUTOMATED: CPT

## 2024-09-16 PROCEDURE — 86900 BLOOD TYPING SEROLOGIC ABO: CPT

## 2024-09-16 PROCEDURE — 81025 URINE PREGNANCY TEST: CPT

## 2024-09-16 PROCEDURE — 36415 COLL VENOUS BLD VENIPUNCTURE: CPT

## 2024-09-16 PROCEDURE — 87389 HIV-1 AG W/HIV-1&-2 AB AG IA: CPT

## 2024-09-16 PROCEDURE — 87340 HEPATITIS B SURFACE AG IA: CPT

## 2024-09-16 PROCEDURE — 81329 SMN1 GENE DOS/DELETION ALYS: CPT

## 2024-09-16 PROCEDURE — 81257 HBA1/HBA2 GENE: CPT

## 2024-09-16 NOTE — DISCUSSION/SUMMARY
[FreeTextEntry1] : #1 [facility] emma  #2 pregnancy approx 11-14 weeks labs ordered nt cfdna all questions answered

## 2024-09-16 NOTE — PHYSICAL EXAM
[Appropriately responsive] : appropriately responsive [No Lymphadenopathy] : no lymphadenopathy [Soft] : soft [Labia Majora] : normal [Labia Minora] : normal [Normal] : normal [Uterine Adnexae] : normal

## 2024-09-17 DIAGNOSIS — Z34.90 ENCOUNTER FOR SUPERVISION OF NORMAL PREGNANCY, UNSPECIFIED, UNSPECIFIED TRIMESTER: ICD-10-CM

## 2024-09-17 DIAGNOSIS — Z00.00 ENCOUNTER FOR GENERAL ADULT MEDICAL EXAMINATION WITHOUT ABNORMAL FINDINGS: ICD-10-CM

## 2024-09-18 ENCOUNTER — APPOINTMENT (OUTPATIENT)
Dept: ANTEPARTUM | Facility: CLINIC | Age: 34
End: 2024-09-18
Payer: COMMERCIAL

## 2024-09-18 ENCOUNTER — ASOB RESULT (OUTPATIENT)
Age: 34
End: 2024-09-18

## 2024-09-18 LAB
ABO + RH PNL BLD: NORMAL
APPEARANCE: CLEAR
BILIRUB UR QL STRIP: NORMAL
BILIRUBIN URINE: NEGATIVE
BLD GP AB SCN SERPL QL: NORMAL
BLOOD URINE: NEGATIVE
CLARITY UR: CLEAR
COLLECTION METHOD: NORMAL
COLOR: YELLOW
ESTIMATED AVERAGE GLUCOSE: 85 MG/DL
GLUCOSE QUALITATIVE U: NEGATIVE MG/DL
GLUCOSE UR-MCNC: NORMAL
HBA1C MFR BLD HPLC: 4.6 %
HBV SURFACE AG SER QL: NONREACTIVE
HCG UR QL: 0.2 EU/DL
HCT VFR BLD CALC: 33.1 %
HCV AB SER QL: NONREACTIVE
HCV S/CO RATIO: 0.06 COI
HGB A MFR BLD: 97.4 %
HGB A2 MFR BLD: 2.6 %
HGB BLD-MCNC: 10.8 G/DL
HGB FRACT BLD-IMP: NORMAL
HGB UR QL STRIP.AUTO: NORMAL
HIV1+2 AB SPEC QL IA.RAPID: NONREACTIVE
KETONES UR-MCNC: NORMAL
KETONES URINE: NEGATIVE MG/DL
LEAD BLD-MCNC: <1 UG/DL
LEUKOCYTE ESTERASE UR QL STRIP: NORMAL
LEUKOCYTE ESTERASE URINE: NEGATIVE
MCHC RBC-ENTMCNC: 30.9 PG
MCHC RBC-ENTMCNC: 32.6 G/DL
MCV RBC AUTO: 94.8 FL
MEV IGG FLD QL IA: 84.2 AU/ML
MEV IGG+IGM SER-IMP: POSITIVE
NITRITE UR QL STRIP: NORMAL
NITRITE URINE: NEGATIVE
PH UR STRIP: 6
PH URINE: 6
PLATELET # BLD AUTO: 293 K/UL
PMV BLD AUTO: 0 /100 WBCS
PMV BLD: 11.3 FL
PROT UR STRIP-MCNC: NORMAL
PROTEIN URINE: NEGATIVE MG/DL
RBC # BLD: 3.49 M/UL
RBC # FLD: 12.9 %
RUBV IGG FLD-ACNC: 1.84 INDEX
RUBV IGG SER-IMP: POSITIVE
SP GR UR STRIP: 1.02
SPECIFIC GRAVITY URINE: 1.01
T PALLIDUM AB SER QL IA: NEGATIVE
UROBILINOGEN URINE: 0.2 MG/DL
VZV AB TITR SER: POSITIVE
VZV IGG SER IF-ACNC: 6.32 S/CO
WBC # FLD AUTO: 12.34 K/UL

## 2024-09-18 PROCEDURE — 76801 OB US < 14 WKS SINGLE FETUS: CPT | Mod: 26

## 2024-09-24 LAB
AR GENE MUT ANL BLD/T: NORMAL
CFTR MUT TESTED BLD/T: NEGATIVE

## 2024-09-26 LAB
1P36 DELETION SYN POPULATION-BASED RISK: NORMAL
1P36 DELETION SYNDROME RESULT: NORMAL
1P36 DELETION SYNDROME RISK AFTER TEST: NORMAL
22Q11.2 DELETION POPULATION-BASED RISK: NORMAL
22Q11.2 DELETION RISK AFTER TEST: NORMAL
22Q11.2 DELETION SYNDROME RESULT: NORMAL
ANGELMAN SYNDROME POPULATION-BASED RISK: NORMAL
ANGELMAN SYNDROME RESULT: NORMAL
ANGELMAN SYNDROME RISK AFTER TEST: NORMAL
CRI-DU-CHAT SYNDR POPULATION-BASED RISK: NORMAL
CRI-DU-CHAT SYNDROME RESULT: NORMAL
CRI-DU-CHAT SYNDROME RISK AFTER TEST: NORMAL
FETAL FRACTION: NORMAL
GENDER OF FETUS: NORMAL
MONOSOMY X AGE-BASED RISK: NORMAL
MONOSOMY X RESULT: NORMAL
MONOSOMY X RISK AFTER TEST: NORMAL
PANORAMA SCREEN: NORMAL
PRADER-WILLI SYND POPULATION-BASED RISK: NORMAL
PRADER-WILLI SYNDROME RESULT: NORMAL
PRADER-WILLI SYNDROME RISK AFTER TEST: NORMAL
REPRODUCTIVE CARRIER MULTIGENE ANL BLD/T: NORMAL
RPT COMMENT: NORMAL
TEST PERFORMANCE INFO SPEC: NORMAL
TRIPLOIDY RESULT: NORMAL
TRISOMY 13 AGE-BASED RISK: NORMAL
TRISOMY 13 RESULT: NORMAL
TRISOMY 13 RISK AFTER TEST: NORMAL
TRISOMY 18 AGE-BASED RISK: NORMAL
TRISOMY 18 RESULT: NORMAL
TRISOMY 18 RISK AFTER TEST: NORMAL
TRISOMY 21 RESULT: NORMAL
TRISOMY 21 RISK AFTER TEST: NORMAL

## 2024-10-07 ENCOUNTER — NON-APPOINTMENT (OUTPATIENT)
Age: 34
End: 2024-10-07

## 2024-10-07 ENCOUNTER — APPOINTMENT (OUTPATIENT)
Dept: OBGYN | Facility: CLINIC | Age: 34
End: 2024-10-07
Payer: COMMERCIAL

## 2024-10-07 ENCOUNTER — OUTPATIENT (OUTPATIENT)
Dept: OUTPATIENT SERVICES | Facility: HOSPITAL | Age: 34
LOS: 1 days | End: 2024-10-07
Payer: COMMERCIAL

## 2024-10-07 VITALS — BODY MASS INDEX: 28.74 KG/M2 | WEIGHT: 189 LBS

## 2024-10-07 DIAGNOSIS — Z34.90 ENCOUNTER FOR SUPERVISION OF NORMAL PREGNANCY, UNSPECIFIED, UNSPECIFIED TRIMESTER: ICD-10-CM

## 2024-10-07 DIAGNOSIS — Z98.890 OTHER SPECIFIED POSTPROCEDURAL STATES: Chronic | ICD-10-CM

## 2024-10-07 PROCEDURE — 76815 OB US LIMITED FETUS(S): CPT | Mod: 26

## 2024-10-07 PROCEDURE — 99214 OFFICE O/P EST MOD 30 MIN: CPT | Mod: 25

## 2024-10-07 PROCEDURE — 81002 URINALYSIS NONAUTO W/O SCOPE: CPT

## 2024-10-07 RX ORDER — ASPIRIN 81 MG/1
81 TABLET, CHEWABLE ORAL DAILY
Qty: 30 | Refills: 10 | Status: ACTIVE | COMMUNITY
Start: 2024-10-07 | End: 1900-01-01

## 2024-10-08 DIAGNOSIS — Z34.90 ENCOUNTER FOR SUPERVISION OF NORMAL PREGNANCY, UNSPECIFIED, UNSPECIFIED TRIMESTER: ICD-10-CM

## 2024-10-09 LAB
BILIRUB UR QL STRIP: NORMAL
CLARITY UR: CLEAR
COLLECTION METHOD: NORMAL
GLUCOSE UR-MCNC: NORMAL
HCG UR QL: 0.2 EU/DL
HGB UR QL STRIP.AUTO: NORMAL
KETONES UR-MCNC: NORMAL
LEUKOCYTE ESTERASE UR QL STRIP: NORMAL
NITRITE UR QL STRIP: NORMAL
PH UR STRIP: 6
PROT UR STRIP-MCNC: NORMAL
SP GR UR STRIP: 1.03

## 2024-10-11 ENCOUNTER — NON-APPOINTMENT (OUTPATIENT)
Age: 34
End: 2024-10-11

## 2024-10-15 ENCOUNTER — APPOINTMENT (OUTPATIENT)
Dept: OBGYN | Facility: CLINIC | Age: 34
End: 2024-10-15

## 2024-10-15 ENCOUNTER — NON-APPOINTMENT (OUTPATIENT)
Age: 34
End: 2024-10-15

## 2024-10-31 ENCOUNTER — OUTPATIENT (OUTPATIENT)
Dept: OUTPATIENT SERVICES | Facility: HOSPITAL | Age: 34
LOS: 1 days | End: 2024-10-31
Payer: COMMERCIAL

## 2024-10-31 DIAGNOSIS — Z98.890 OTHER SPECIFIED POSTPROCEDURAL STATES: Chronic | ICD-10-CM

## 2024-10-31 DIAGNOSIS — Z34.90 ENCOUNTER FOR SUPERVISION OF NORMAL PREGNANCY, UNSPECIFIED, UNSPECIFIED TRIMESTER: ICD-10-CM

## 2024-10-31 PROCEDURE — 82105 ALPHA-FETOPROTEIN SERUM: CPT

## 2024-10-31 PROCEDURE — 36415 COLL VENOUS BLD VENIPUNCTURE: CPT

## 2024-11-01 DIAGNOSIS — Z34.90 ENCOUNTER FOR SUPERVISION OF NORMAL PREGNANCY, UNSPECIFIED, UNSPECIFIED TRIMESTER: ICD-10-CM

## 2024-11-04 ENCOUNTER — NON-APPOINTMENT (OUTPATIENT)
Age: 34
End: 2024-11-04

## 2024-11-04 ENCOUNTER — APPOINTMENT (OUTPATIENT)
Dept: OBGYN | Facility: CLINIC | Age: 34
End: 2024-11-04
Payer: COMMERCIAL

## 2024-11-04 ENCOUNTER — OUTPATIENT (OUTPATIENT)
Dept: OUTPATIENT SERVICES | Facility: HOSPITAL | Age: 34
LOS: 1 days | End: 2024-11-04
Payer: COMMERCIAL

## 2024-11-04 VITALS — SYSTOLIC BLOOD PRESSURE: 111 MMHG | WEIGHT: 201 LBS | BODY MASS INDEX: 30.56 KG/M2 | DIASTOLIC BLOOD PRESSURE: 71 MMHG

## 2024-11-04 DIAGNOSIS — Z34.90 ENCOUNTER FOR SUPERVISION OF NORMAL PREGNANCY, UNSPECIFIED, UNSPECIFIED TRIMESTER: ICD-10-CM

## 2024-11-04 DIAGNOSIS — Z98.890 OTHER SPECIFIED POSTPROCEDURAL STATES: Chronic | ICD-10-CM

## 2024-11-04 PROCEDURE — 81002 URINALYSIS NONAUTO W/O SCOPE: CPT

## 2024-11-04 PROCEDURE — 99213 OFFICE O/P EST LOW 20 MIN: CPT

## 2024-11-05 DIAGNOSIS — Z34.90 ENCOUNTER FOR SUPERVISION OF NORMAL PREGNANCY, UNSPECIFIED, UNSPECIFIED TRIMESTER: ICD-10-CM

## 2024-11-06 LAB
BILIRUB UR QL STRIP: NEGATIVE
CLARITY UR: CLEAR
COLLECTION METHOD: NORMAL
GLUCOSE UR-MCNC: NEGATIVE
HCG UR QL: NEGATIVE EU/DL
HGB UR QL STRIP.AUTO: NEGATIVE
KETONES UR-MCNC: NEGATIVE
LEUKOCYTE ESTERASE UR QL STRIP: NEGATIVE
NITRITE UR QL STRIP: NEGATIVE
PH UR STRIP: 6
PROT UR STRIP-MCNC: NEGATIVE
SP GR UR STRIP: 1.02

## 2024-11-07 ENCOUNTER — OUTPATIENT (OUTPATIENT)
Dept: OUTPATIENT SERVICES | Facility: HOSPITAL | Age: 34
LOS: 1 days | End: 2024-11-07
Payer: COMMERCIAL

## 2024-11-07 ENCOUNTER — ASOB RESULT (OUTPATIENT)
Age: 34
End: 2024-11-07

## 2024-11-07 ENCOUNTER — APPOINTMENT (OUTPATIENT)
Dept: ANTEPARTUM | Facility: CLINIC | Age: 34
End: 2024-11-07
Payer: COMMERCIAL

## 2024-11-07 DIAGNOSIS — Z98.890 OTHER SPECIFIED POSTPROCEDURAL STATES: Chronic | ICD-10-CM

## 2024-11-07 DIAGNOSIS — Z34.90 ENCOUNTER FOR SUPERVISION OF NORMAL PREGNANCY, UNSPECIFIED, UNSPECIFIED TRIMESTER: ICD-10-CM

## 2024-11-07 PROCEDURE — 76811 OB US DETAILED SNGL FETUS: CPT | Mod: 26

## 2024-11-07 PROCEDURE — 76817 TRANSVAGINAL US OBSTETRIC: CPT | Mod: 26

## 2024-11-07 PROCEDURE — 76811 OB US DETAILED SNGL FETUS: CPT

## 2024-11-07 PROCEDURE — 76817 TRANSVAGINAL US OBSTETRIC: CPT

## 2024-11-11 DIAGNOSIS — Z36.86 ENCOUNTER FOR ANTENATAL SCREENING FOR CERVICAL LENGTH: ICD-10-CM

## 2024-11-11 DIAGNOSIS — O26.842 UTERINE SIZE-DATE DISCREPANCY, SECOND TRIMESTER: ICD-10-CM

## 2024-11-11 DIAGNOSIS — Z3A.20 20 WEEKS GESTATION OF PREGNANCY: ICD-10-CM

## 2024-11-11 DIAGNOSIS — O35.8XX0 MATERNAL CARE FOR OTHER (SUSPECTED) FETAL ABNORMALITY AND DAMAGE, NOT APPLICABLE OR UNSPECIFIED: ICD-10-CM

## 2024-11-11 DIAGNOSIS — O09.522 SUPERVISION OF ELDERLY MULTIGRAVIDA, SECOND TRIMESTER: ICD-10-CM

## 2024-12-02 ENCOUNTER — NON-APPOINTMENT (OUTPATIENT)
Age: 34
End: 2024-12-02

## 2024-12-02 ENCOUNTER — APPOINTMENT (OUTPATIENT)
Dept: OBGYN | Facility: CLINIC | Age: 34
End: 2024-12-02

## 2025-01-08 ENCOUNTER — NON-APPOINTMENT (OUTPATIENT)
Age: 35
End: 2025-01-08

## 2025-01-08 ENCOUNTER — APPOINTMENT (OUTPATIENT)
Dept: OBGYN | Facility: CLINIC | Age: 35
End: 2025-01-08
Payer: COMMERCIAL

## 2025-01-08 VITALS
DIASTOLIC BLOOD PRESSURE: 70 MMHG | BODY MASS INDEX: 34.71 KG/M2 | HEIGHT: 68 IN | SYSTOLIC BLOOD PRESSURE: 110 MMHG | WEIGHT: 229 LBS

## 2025-01-08 DIAGNOSIS — Z34.90 ENCOUNTER FOR SUPERVISION OF NORMAL PREGNANCY, UNSPECIFIED, UNSPECIFIED TRIMESTER: ICD-10-CM

## 2025-01-08 PROCEDURE — 99213 OFFICE O/P EST LOW 20 MIN: CPT

## 2025-01-10 LAB
BILIRUB UR QL STRIP: NORMAL
CLARITY UR: CLEAR
COLLECTION METHOD: NORMAL
GLUCOSE UR-MCNC: NORMAL
HCG UR QL: 0.2 EU/DL
HGB UR QL STRIP.AUTO: NORMAL
KETONES UR-MCNC: NORMAL
LEUKOCYTE ESTERASE UR QL STRIP: NORMAL
NITRITE UR QL STRIP: NORMAL
PH UR STRIP: 6
PROT UR STRIP-MCNC: NORMAL
SP GR UR STRIP: 1.02